# Patient Record
Sex: FEMALE | Race: AMERICAN INDIAN OR ALASKA NATIVE | NOT HISPANIC OR LATINO | Employment: FULL TIME | URBAN - METROPOLITAN AREA
[De-identification: names, ages, dates, MRNs, and addresses within clinical notes are randomized per-mention and may not be internally consistent; named-entity substitution may affect disease eponyms.]

---
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
## 2025-03-16 ENCOUNTER — HOSPITAL ENCOUNTER (INPATIENT)
Facility: MEDICAL CENTER | Age: 20
LOS: 2 days | DRG: 918 | End: 2025-03-18
Attending: STUDENT IN AN ORGANIZED HEALTH CARE EDUCATION/TRAINING PROGRAM | Admitting: STUDENT IN AN ORGANIZED HEALTH CARE EDUCATION/TRAINING PROGRAM
Payer: COMMERCIAL

## 2025-03-16 DIAGNOSIS — T14.91XA SUICIDE ATTEMPT (HCC): ICD-10-CM

## 2025-03-16 PROBLEM — T39.1X2A TYLENOL OVERDOSE, INTENTIONAL SELF-HARM, INITIAL ENCOUNTER (HCC): Status: ACTIVE | Noted: 2025-03-16

## 2025-03-16 LAB
APAP SERPL-MCNC: 171 UG/ML (ref 10–30)
APAP SERPL-MCNC: 84.9 UG/ML (ref 10–30)
ARTERIAL PATENCY WRIST A: ABNORMAL
BASE EXCESS BLDA CALC-SCNC: -6 MMOL/L (ref -4–3)
BODY TEMPERATURE: ABNORMAL DEGREES
CO2 BLDA-SCNC: 20 MMOL/L (ref 32–48)
DELSYS IDSYS: ABNORMAL
HCO3 BLDA-SCNC: 19.2 MMOL/L (ref 21–28)
INR PPP: 1.07 (ref 0.87–1.13)
LACTATE BLD-SCNC: 0.8 MMOL/L (ref 0.5–2)
LACTATE SERPL-SCNC: 1.3 MMOL/L (ref 0.5–2)
LPM ILPM: 0 LPM
PCO2 BLDA: 35.7 MMHG (ref 32–48)
PH BLDA: 7.34 [PH] (ref 7.35–7.45)
PO2 BLDA: 37 MMHG (ref 83–108)
PROTHROMBIN TIME: 13.9 SEC (ref 12–14.6)
SAO2 % BLDA: 67 % (ref 93–99)
SPECIMEN DRAWN FROM PATIENT: ABNORMAL

## 2025-03-16 PROCEDURE — 770000 HCHG ROOM/CARE - INTERMEDIATE ICU *

## 2025-03-16 PROCEDURE — 82803 BLOOD GASES ANY COMBINATION: CPT

## 2025-03-16 PROCEDURE — 93005 ELECTROCARDIOGRAM TRACING: CPT | Mod: TC | Performed by: STUDENT IN AN ORGANIZED HEALTH CARE EDUCATION/TRAINING PROGRAM

## 2025-03-16 PROCEDURE — 36600 WITHDRAWAL OF ARTERIAL BLOOD: CPT

## 2025-03-16 PROCEDURE — 85025 COMPLETE CBC W/AUTO DIFF WBC: CPT

## 2025-03-16 PROCEDURE — 99291 CRITICAL CARE FIRST HOUR: CPT | Performed by: STUDENT IN AN ORGANIZED HEALTH CARE EDUCATION/TRAINING PROGRAM

## 2025-03-16 PROCEDURE — 700101 HCHG RX REV CODE 250: Performed by: STUDENT IN AN ORGANIZED HEALTH CARE EDUCATION/TRAINING PROGRAM

## 2025-03-16 PROCEDURE — 80143 DRUG ASSAY ACETAMINOPHEN: CPT | Mod: 91

## 2025-03-16 PROCEDURE — 85610 PROTHROMBIN TIME: CPT

## 2025-03-16 PROCEDURE — 83605 ASSAY OF LACTIC ACID: CPT

## 2025-03-16 PROCEDURE — 700105 HCHG RX REV CODE 258: Performed by: STUDENT IN AN ORGANIZED HEALTH CARE EDUCATION/TRAINING PROGRAM

## 2025-03-16 PROCEDURE — 700111 HCHG RX REV CODE 636 W/ 250 OVERRIDE (IP): Mod: JZ | Performed by: STUDENT IN AN ORGANIZED HEALTH CARE EDUCATION/TRAINING PROGRAM

## 2025-03-16 PROCEDURE — 80053 COMPREHEN METABOLIC PANEL: CPT

## 2025-03-16 PROCEDURE — 83735 ASSAY OF MAGNESIUM: CPT

## 2025-03-16 RX ORDER — PROMETHAZINE HYDROCHLORIDE 12.5 MG/1
12.5-25 SUPPOSITORY RECTAL EVERY 4 HOURS PRN
Status: DISCONTINUED | OUTPATIENT
Start: 2025-03-16 | End: 2025-03-18 | Stop reason: HOSPADM

## 2025-03-16 RX ORDER — ONDANSETRON 2 MG/ML
4 INJECTION INTRAMUSCULAR; INTRAVENOUS EVERY 4 HOURS PRN
Status: DISCONTINUED | OUTPATIENT
Start: 2025-03-16 | End: 2025-03-18 | Stop reason: HOSPADM

## 2025-03-16 RX ORDER — HYDROMORPHONE HYDROCHLORIDE 1 MG/ML
1 INJECTION, SOLUTION INTRAMUSCULAR; INTRAVENOUS; SUBCUTANEOUS EVERY 4 HOURS PRN
Status: DISCONTINUED | OUTPATIENT
Start: 2025-03-16 | End: 2025-03-18

## 2025-03-16 RX ORDER — PROMETHAZINE HYDROCHLORIDE 25 MG/1
12.5-25 TABLET ORAL EVERY 4 HOURS PRN
Status: DISCONTINUED | OUTPATIENT
Start: 2025-03-16 | End: 2025-03-18 | Stop reason: HOSPADM

## 2025-03-16 RX ORDER — PROCHLORPERAZINE EDISYLATE 5 MG/ML
5-10 INJECTION INTRAMUSCULAR; INTRAVENOUS EVERY 4 HOURS PRN
Status: DISCONTINUED | OUTPATIENT
Start: 2025-03-16 | End: 2025-03-18 | Stop reason: HOSPADM

## 2025-03-16 RX ORDER — LABETALOL HYDROCHLORIDE 5 MG/ML
10 INJECTION, SOLUTION INTRAVENOUS EVERY 4 HOURS PRN
Status: DISCONTINUED | OUTPATIENT
Start: 2025-03-16 | End: 2025-03-18 | Stop reason: HOSPADM

## 2025-03-16 RX ORDER — ENOXAPARIN SODIUM 100 MG/ML
40 INJECTION SUBCUTANEOUS DAILY
Status: DISCONTINUED | OUTPATIENT
Start: 2025-03-17 | End: 2025-03-18 | Stop reason: HOSPADM

## 2025-03-16 RX ORDER — ONDANSETRON 4 MG/1
4 TABLET, ORALLY DISINTEGRATING ORAL EVERY 4 HOURS PRN
Status: DISCONTINUED | OUTPATIENT
Start: 2025-03-16 | End: 2025-03-18 | Stop reason: HOSPADM

## 2025-03-16 RX ADMIN — FOMEPIZOLE 1240 MG: 1 INJECTION, SOLUTION INTRAVENOUS at 20:09

## 2025-03-16 RX ADMIN — ACETYLCYSTEINE 17000 MG: 200 SOLUTION ORAL; RESPIRATORY (INHALATION) at 20:07

## 2025-03-16 ASSESSMENT — PATIENT HEALTH QUESTIONNAIRE - PHQ9
4. FEELING TIRED OR HAVING LITTLE ENERGY: MORE THAN HALF THE DAYS
7. TROUBLE CONCENTRATING ON THINGS, SUCH AS READING THE NEWSPAPER OR WATCHING TELEVISION: NOT AT ALL
6. FEELING BAD ABOUT YOURSELF - OR THAT YOU ARE A FAILURE OR HAVE LET YOURSELF OR YOUR FAMILY DOWN: NEARLY EVERY DAY
9. THOUGHTS THAT YOU WOULD BE BETTER OFF DEAD, OR OF HURTING YOURSELF: SEVERAL DAYS
2. FEELING DOWN, DEPRESSED, IRRITABLE, OR HOPELESS: MORE THAN HALF THE DAYS
5. POOR APPETITE OR OVEREATING: SEVERAL DAYS
3. TROUBLE FALLING OR STAYING ASLEEP OR SLEEPING TOO MUCH: MORE THAN HALF THE DAYS
SUM OF ALL RESPONSES TO PHQ QUESTIONS 1-9: 13
SUM OF ALL RESPONSES TO PHQ9 QUESTIONS 1 AND 2: 4
8. MOVING OR SPEAKING SO SLOWLY THAT OTHER PEOPLE COULD HAVE NOTICED. OR THE OPPOSITE, BEING SO FIGETY OR RESTLESS THAT YOU HAVE BEEN MOVING AROUND A LOT MORE THAN USUAL: NOT AT ALL
1. LITTLE INTEREST OR PLEASURE IN DOING THINGS: MORE THAN HALF THE DAYS

## 2025-03-16 ASSESSMENT — ENCOUNTER SYMPTOMS
EYES NEGATIVE: 1
CARDIOVASCULAR NEGATIVE: 1
RESPIRATORY NEGATIVE: 1
DEPRESSION: 1
MUSCULOSKELETAL NEGATIVE: 1
GASTROINTESTINAL NEGATIVE: 1
CONSTITUTIONAL NEGATIVE: 1
NEUROLOGICAL NEGATIVE: 1

## 2025-03-16 ASSESSMENT — PAIN DESCRIPTION - PAIN TYPE: TYPE: ACUTE PAIN

## 2025-03-16 ASSESSMENT — FIBROSIS 4 INDEX: FIB4 SCORE: 0.27

## 2025-03-16 NOTE — PROGRESS NOTES
[facility] DIRECT ADMISSION REPORT  Transferring facility: [facility]  Transferring physician: Dr. Perez    Chief complaint: Tylenol overdose  Pertinent history & patient course:     19-year-old female presented to outside facility after taking around 108 tablets of 500 mg acetaminophen around 10:30 AM on 3/16 trying to kill herself after breaking up with her boyfriend.  Labs at outside facility did not show elevated liver enzymes, case was discussed with poisoning center also with intensivist  who accepted the patient to IMCU.  Mucomyst provided at outside facility, to continue Mucomyst by protocol.    Pertinent imaging & lab results: Acetaminophen level  Consultants called prior to transfer and pertinent input from consultants: Intensivist  Code Status: Full code per transferring provider, I personally verified with the transferring provider patient's code status and the transferring provider has confirmed this with the patient.  Reason for Transfer: ICU  Further work up or recommendations requested prior to transfer: None    Patient accepted for transfer: Yes  Accepting [facility] Facility: [facility] - Nursing to notify the Triage Coordinator in the RTOC via Voalte or Phone ext. [phone] when patient arrives to the unit. The Triage Coordinator will assign the admitting provider.    Consultants to be called upon arrival: ICU if needed  Admission status: Inpatient.   Floor requested: IMCU  If ICU transfer, name of intensivist case discussed with and pertinent input from critical care:     The admitting provider is the point of contact for questions or concerns regarding patient's care.

## 2025-03-17 PROBLEM — E66.9 OBESITY (BMI 30-39.9): Status: ACTIVE | Noted: 2025-03-17

## 2025-03-17 LAB
ALBUMIN SERPL BCP-MCNC: 3.9 G/DL (ref 3.2–4.9)
ALBUMIN SERPL BCP-MCNC: 4 G/DL (ref 3.2–4.9)
ALBUMIN SERPL BCP-MCNC: 4 G/DL (ref 3.2–4.9)
ALBUMIN SERPL BCP-MCNC: 4.1 G/DL (ref 3.2–4.9)
ALBUMIN/GLOB SERPL: 1.3 G/DL
ALBUMIN/GLOB SERPL: 1.4 G/DL
ALP SERPL-CCNC: 66 U/L (ref 30–99)
ALP SERPL-CCNC: 67 U/L (ref 30–99)
ALP SERPL-CCNC: 69 U/L (ref 30–99)
ALP SERPL-CCNC: 73 U/L (ref 30–99)
ALT SERPL-CCNC: 37 U/L (ref 2–50)
ALT SERPL-CCNC: 38 U/L (ref 2–50)
ALT SERPL-CCNC: 40 U/L (ref 2–50)
ALT SERPL-CCNC: 42 U/L (ref 2–50)
ANION GAP SERPL CALC-SCNC: 10 MMOL/L (ref 7–16)
ANION GAP SERPL CALC-SCNC: 14 MMOL/L (ref 7–16)
APAP SERPL-MCNC: 22.6 UG/ML (ref 10–30)
APAP SERPL-MCNC: 39.8 UG/ML (ref 10–30)
APAP SERPL-MCNC: 8.2 UG/ML (ref 10–30)
APAP SERPL-MCNC: <5 UG/ML (ref 10–30)
APPEARANCE UR: CLEAR
AST SERPL-CCNC: 21 U/L (ref 12–45)
AST SERPL-CCNC: 24 U/L (ref 12–45)
AST SERPL-CCNC: 25 U/L (ref 12–45)
AST SERPL-CCNC: 26 U/L (ref 12–45)
BASOPHILS # BLD AUTO: 0.1 % (ref 0–1.8)
BASOPHILS # BLD: 0.02 K/UL (ref 0–0.12)
BILIRUB SERPL-MCNC: 0.3 MG/DL (ref 0.1–1.5)
BILIRUB UR QL STRIP.AUTO: NEGATIVE
BUN SERPL-MCNC: 11 MG/DL (ref 8–22)
BUN SERPL-MCNC: 13 MG/DL (ref 8–22)
CALCIUM ALBUM COR SERPL-MCNC: 8.5 MG/DL (ref 8.5–10.5)
CALCIUM ALBUM COR SERPL-MCNC: 8.8 MG/DL (ref 8.5–10.5)
CALCIUM ALBUM COR SERPL-MCNC: 8.8 MG/DL (ref 8.5–10.5)
CALCIUM ALBUM COR SERPL-MCNC: 9 MG/DL (ref 8.5–10.5)
CALCIUM SERPL-MCNC: 8.6 MG/DL (ref 8.5–10.5)
CALCIUM SERPL-MCNC: 8.8 MG/DL (ref 8.5–10.5)
CALCIUM SERPL-MCNC: 8.8 MG/DL (ref 8.5–10.5)
CALCIUM SERPL-MCNC: 8.9 MG/DL (ref 8.5–10.5)
CHLORIDE SERPL-SCNC: 107 MMOL/L (ref 96–112)
CHLORIDE SERPL-SCNC: 107 MMOL/L (ref 96–112)
CHLORIDE SERPL-SCNC: 109 MMOL/L (ref 96–112)
CHLORIDE SERPL-SCNC: 114 MMOL/L (ref 96–112)
CO2 SERPL-SCNC: 14 MMOL/L (ref 20–33)
CO2 SERPL-SCNC: 18 MMOL/L (ref 20–33)
CO2 SERPL-SCNC: 19 MMOL/L (ref 20–33)
CO2 SERPL-SCNC: 20 MMOL/L (ref 20–33)
COLOR UR: YELLOW
CREAT SERPL-MCNC: 0.61 MG/DL (ref 0.5–1.4)
CREAT SERPL-MCNC: 0.63 MG/DL (ref 0.5–1.4)
CREAT SERPL-MCNC: 0.67 MG/DL (ref 0.5–1.4)
CREAT SERPL-MCNC: 0.69 MG/DL (ref 0.5–1.4)
EKG IMPRESSION: NORMAL
EOSINOPHIL # BLD AUTO: 0.11 K/UL (ref 0–0.51)
EOSINOPHIL NFR BLD: 0.8 % (ref 0–6.9)
ERYTHROCYTE [DISTWIDTH] IN BLOOD BY AUTOMATED COUNT: 40.6 FL (ref 35.9–50)
GFR SERPLBLD CREATININE-BSD FMLA CKD-EPI: 127 ML/MIN/1.73 M 2
GFR SERPLBLD CREATININE-BSD FMLA CKD-EPI: 128 ML/MIN/1.73 M 2
GFR SERPLBLD CREATININE-BSD FMLA CKD-EPI: 130 ML/MIN/1.73 M 2
GFR SERPLBLD CREATININE-BSD FMLA CKD-EPI: 131 ML/MIN/1.73 M 2
GLOBULIN SER CALC-MCNC: 2.9 G/DL (ref 1.9–3.5)
GLOBULIN SER CALC-MCNC: 2.9 G/DL (ref 1.9–3.5)
GLOBULIN SER CALC-MCNC: 3 G/DL (ref 1.9–3.5)
GLOBULIN SER CALC-MCNC: 3.1 G/DL (ref 1.9–3.5)
GLUCOSE SERPL-MCNC: 106 MG/DL (ref 65–99)
GLUCOSE SERPL-MCNC: 117 MG/DL (ref 65–99)
GLUCOSE SERPL-MCNC: 90 MG/DL (ref 65–99)
GLUCOSE SERPL-MCNC: 98 MG/DL (ref 65–99)
GLUCOSE UR STRIP.AUTO-MCNC: NEGATIVE MG/DL
HCG UR QL: NEGATIVE
HCT VFR BLD AUTO: 40.3 % (ref 37–47)
HGB BLD-MCNC: 13.8 G/DL (ref 12–16)
IMM GRANULOCYTES # BLD AUTO: 0.02 K/UL (ref 0–0.11)
IMM GRANULOCYTES NFR BLD AUTO: 0.1 % (ref 0–0.9)
INR PPP: 1.1 (ref 0.87–1.13)
INR PPP: 1.1 (ref 0.87–1.13)
KETONES UR STRIP.AUTO-MCNC: 15 MG/DL
LEUKOCYTE ESTERASE UR QL STRIP.AUTO: NEGATIVE
LYMPHOCYTES # BLD AUTO: 3.7 K/UL (ref 1–4.8)
LYMPHOCYTES NFR BLD: 26.3 % (ref 22–41)
MAGNESIUM SERPL-MCNC: 2.2 MG/DL (ref 1.5–2.5)
MCH RBC QN AUTO: 30.3 PG (ref 27–33)
MCHC RBC AUTO-ENTMCNC: 34.2 G/DL (ref 32.2–35.5)
MCV RBC AUTO: 88.4 FL (ref 81.4–97.8)
MICRO URNS: ABNORMAL
MONOCYTES # BLD AUTO: 0.96 K/UL (ref 0–0.85)
MONOCYTES NFR BLD AUTO: 6.8 % (ref 0–13.4)
NEUTROPHILS # BLD AUTO: 9.27 K/UL (ref 1.82–7.42)
NEUTROPHILS NFR BLD: 65.9 % (ref 44–72)
NITRITE UR QL STRIP.AUTO: NEGATIVE
NRBC # BLD AUTO: 0 K/UL
NRBC BLD-RTO: 0 /100 WBC (ref 0–0.2)
PH UR STRIP.AUTO: 5.5 [PH] (ref 5–8)
PLATELET # BLD AUTO: 340 K/UL (ref 164–446)
PMV BLD AUTO: 9.8 FL (ref 9–12.9)
POTASSIUM SERPL-SCNC: 2.8 MMOL/L (ref 3.6–5.5)
POTASSIUM SERPL-SCNC: 3.2 MMOL/L (ref 3.6–5.5)
POTASSIUM SERPL-SCNC: 4.5 MMOL/L (ref 3.6–5.5)
POTASSIUM SERPL-SCNC: 4.8 MMOL/L (ref 3.6–5.5)
PROT SERPL-MCNC: 6.9 G/DL (ref 6–8.2)
PROT SERPL-MCNC: 6.9 G/DL (ref 6–8.2)
PROT SERPL-MCNC: 7 G/DL (ref 6–8.2)
PROT SERPL-MCNC: 7.1 G/DL (ref 6–8.2)
PROT UR QL STRIP: NEGATIVE MG/DL
PROTHROMBIN TIME: 14.2 SEC (ref 12–14.6)
PROTHROMBIN TIME: 14.2 SEC (ref 12–14.6)
RBC # BLD AUTO: 4.56 M/UL (ref 4.2–5.4)
RBC UR QL AUTO: NEGATIVE
SODIUM SERPL-SCNC: 137 MMOL/L (ref 135–145)
SODIUM SERPL-SCNC: 138 MMOL/L (ref 135–145)
SODIUM SERPL-SCNC: 138 MMOL/L (ref 135–145)
SODIUM SERPL-SCNC: 139 MMOL/L (ref 135–145)
SP GR UR STRIP.AUTO: >1.045
UROBILINOGEN UR STRIP.AUTO-MCNC: 1 EU/DL
WBC # BLD AUTO: 14.1 K/UL (ref 4.8–10.8)

## 2025-03-17 PROCEDURE — A9270 NON-COVERED ITEM OR SERVICE: HCPCS | Performed by: STUDENT IN AN ORGANIZED HEALTH CARE EDUCATION/TRAINING PROGRAM

## 2025-03-17 PROCEDURE — A9270 NON-COVERED ITEM OR SERVICE: HCPCS | Performed by: INTERNAL MEDICINE

## 2025-03-17 PROCEDURE — 700102 HCHG RX REV CODE 250 W/ 637 OVERRIDE(OP): Performed by: INTERNAL MEDICINE

## 2025-03-17 PROCEDURE — 80143 DRUG ASSAY ACETAMINOPHEN: CPT

## 2025-03-17 PROCEDURE — 99291 CRITICAL CARE FIRST HOUR: CPT | Performed by: INTERNAL MEDICINE

## 2025-03-17 PROCEDURE — 700111 HCHG RX REV CODE 636 W/ 250 OVERRIDE (IP): Performed by: INTERNAL MEDICINE

## 2025-03-17 PROCEDURE — 81003 URINALYSIS AUTO W/O SCOPE: CPT

## 2025-03-17 PROCEDURE — 700105 HCHG RX REV CODE 258: Performed by: STUDENT IN AN ORGANIZED HEALTH CARE EDUCATION/TRAINING PROGRAM

## 2025-03-17 PROCEDURE — 80053 COMPREHEN METABOLIC PANEL: CPT | Mod: 91

## 2025-03-17 PROCEDURE — 93010 ELECTROCARDIOGRAM REPORT: CPT | Performed by: INTERNAL MEDICINE

## 2025-03-17 PROCEDURE — 700101 HCHG RX REV CODE 250: Performed by: STUDENT IN AN ORGANIZED HEALTH CARE EDUCATION/TRAINING PROGRAM

## 2025-03-17 PROCEDURE — 85610 PROTHROMBIN TIME: CPT | Mod: 91

## 2025-03-17 PROCEDURE — 99255 IP/OBS CONSLTJ NEW/EST HI 80: CPT | Performed by: PSYCHIATRY & NEUROLOGY

## 2025-03-17 PROCEDURE — 80307 DRUG TEST PRSMV CHEM ANLYZR: CPT

## 2025-03-17 PROCEDURE — 700102 HCHG RX REV CODE 250 W/ 637 OVERRIDE(OP): Performed by: STUDENT IN AN ORGANIZED HEALTH CARE EDUCATION/TRAINING PROGRAM

## 2025-03-17 PROCEDURE — 81025 URINE PREGNANCY TEST: CPT

## 2025-03-17 PROCEDURE — G0480 DRUG TEST DEF 1-7 CLASSES: HCPCS

## 2025-03-17 PROCEDURE — 700111 HCHG RX REV CODE 636 W/ 250 OVERRIDE (IP): Mod: JZ | Performed by: STUDENT IN AN ORGANIZED HEALTH CARE EDUCATION/TRAINING PROGRAM

## 2025-03-17 PROCEDURE — 770000 HCHG ROOM/CARE - INTERMEDIATE ICU *

## 2025-03-17 RX ORDER — POTASSIUM CHLORIDE 1500 MG/1
40 TABLET, EXTENDED RELEASE ORAL EVERY 4 HOURS
Status: COMPLETED | OUTPATIENT
Start: 2025-03-17 | End: 2025-03-17

## 2025-03-17 RX ORDER — POTASSIUM CHLORIDE 7.45 MG/ML
10 INJECTION INTRAVENOUS
Status: DISPENSED | OUTPATIENT
Start: 2025-03-17 | End: 2025-03-17

## 2025-03-17 RX ORDER — POTASSIUM CHLORIDE 7.45 MG/ML
10 INJECTION INTRAVENOUS ONCE
Status: COMPLETED | OUTPATIENT
Start: 2025-03-17 | End: 2025-03-17

## 2025-03-17 RX ORDER — POTASSIUM CHLORIDE 1500 MG/1
40 TABLET, EXTENDED RELEASE ORAL 2 TIMES DAILY
Status: DISCONTINUED | OUTPATIENT
Start: 2025-03-17 | End: 2025-03-17

## 2025-03-17 RX ORDER — FLUVOXAMINE MALEATE 50 MG
50 TABLET ORAL EVERY EVENING
Status: DISCONTINUED | OUTPATIENT
Start: 2025-03-17 | End: 2025-03-18 | Stop reason: HOSPADM

## 2025-03-17 RX ADMIN — POTASSIUM CHLORIDE 40 MEQ: 1500 TABLET, EXTENDED RELEASE ORAL at 02:35

## 2025-03-17 RX ADMIN — ACETYLCYSTEINE 17000 MG: 200 SOLUTION ORAL; RESPIRATORY (INHALATION) at 15:43

## 2025-03-17 RX ADMIN — POTASSIUM CHLORIDE 10 MEQ: 7.46 INJECTION, SOLUTION INTRAVENOUS at 09:10

## 2025-03-17 RX ADMIN — POTASSIUM CHLORIDE 40 MEQ: 1500 TABLET, EXTENDED RELEASE ORAL at 08:30

## 2025-03-17 RX ADMIN — POTASSIUM CHLORIDE 40 MEQ: 1500 TABLET, EXTENDED RELEASE ORAL at 05:03

## 2025-03-17 RX ADMIN — ENOXAPARIN SODIUM 40 MG: 100 INJECTION SUBCUTANEOUS at 18:47

## 2025-03-17 RX ADMIN — FOMEPIZOLE 830 MG: 1 INJECTION, SOLUTION INTRAVENOUS at 08:29

## 2025-03-17 RX ADMIN — HYDROMORPHONE HYDROCHLORIDE 1 MG: 1 INJECTION, SOLUTION INTRAMUSCULAR; INTRAVENOUS; SUBCUTANEOUS at 02:54

## 2025-03-17 RX ADMIN — FLUVOXAMINE MALEATE 50 MG: 50 TABLET, COATED ORAL at 21:15

## 2025-03-17 RX ADMIN — POTASSIUM CHLORIDE 10 MEQ: 7.46 INJECTION, SOLUTION INTRAVENOUS at 11:29

## 2025-03-17 SDOH — ECONOMIC STABILITY: TRANSPORTATION INSECURITY
IN THE PAST 12 MONTHS, HAS THE LACK OF TRANSPORTATION KEPT YOU FROM MEDICAL APPOINTMENTS OR FROM GETTING MEDICATIONS?: NO

## 2025-03-17 SDOH — ECONOMIC STABILITY: TRANSPORTATION INSECURITY
IN THE PAST 12 MONTHS, HAS LACK OF RELIABLE TRANSPORTATION KEPT YOU FROM MEDICAL APPOINTMENTS, MEETINGS, WORK OR FROM GETTING THINGS NEEDED FOR DAILY LIVING?: NO

## 2025-03-17 ASSESSMENT — FIBROSIS 4 INDEX
FIB4 SCORE: 0.22
FIB4 SCORE: 0.22

## 2025-03-17 ASSESSMENT — COGNITIVE AND FUNCTIONAL STATUS - GENERAL
SUGGESTED CMS G CODE MODIFIER MOBILITY: CH
SUGGESTED CMS G CODE MODIFIER DAILY ACTIVITY: CH
DAILY ACTIVITIY SCORE: 24
MOBILITY SCORE: 24

## 2025-03-17 ASSESSMENT — LIFESTYLE VARIABLES
SUBSTANCE_ABUSE: 0
TOTAL SCORE: 0
TOTAL SCORE: 0
ALCOHOL_USE: NO
HAVE PEOPLE ANNOYED YOU BY CRITICIZING YOUR DRINKING: NO
CONSUMPTION TOTAL: NEGATIVE
ON A TYPICAL DAY WHEN YOU DRINK ALCOHOL HOW MANY DRINKS DO YOU HAVE: 0
TOTAL SCORE: 0
EVER HAD A DRINK FIRST THING IN THE MORNING TO STEADY YOUR NERVES TO GET RID OF A HANGOVER: NO
EVER FELT BAD OR GUILTY ABOUT YOUR DRINKING: NO
HOW MANY TIMES IN THE PAST YEAR HAVE YOU HAD 5 OR MORE DRINKS IN A DAY: 0
HAVE YOU EVER FELT YOU SHOULD CUT DOWN ON YOUR DRINKING: NO
AVERAGE NUMBER OF DAYS PER WEEK YOU HAVE A DRINK CONTAINING ALCOHOL: 0

## 2025-03-17 ASSESSMENT — SOCIAL DETERMINANTS OF HEALTH (SDOH)
IN THE PAST 12 MONTHS, HAS THE ELECTRIC, GAS, OIL, OR WATER COMPANY THREATENED TO SHUT OFF SERVICE IN YOUR HOME?: NO
WITHIN THE LAST YEAR, HAVE YOU BEEN AFRAID OF YOUR PARTNER OR EX-PARTNER?: NO
WITHIN THE LAST YEAR, HAVE TO BEEN RAPED OR FORCED TO HAVE ANY KIND OF SEXUAL ACTIVITY BY YOUR PARTNER OR EX-PARTNER?: NO
WITHIN THE LAST YEAR, HAVE YOU BEEN KICKED, HIT, SLAPPED, OR OTHERWISE PHYSICALLY HURT BY YOUR PARTNER OR EX-PARTNER?: YES
WITHIN THE PAST 12 MONTHS, YOU WORRIED THAT YOUR FOOD WOULD RUN OUT BEFORE YOU GOT THE MONEY TO BUY MORE: NEVER TRUE
WITHIN THE LAST YEAR, HAVE YOU BEEN HUMILIATED OR EMOTIONALLY ABUSED IN OTHER WAYS BY YOUR PARTNER OR EX-PARTNER?: YES
WITHIN THE PAST 12 MONTHS, THE FOOD YOU BOUGHT JUST DIDN'T LAST AND YOU DIDN'T HAVE MONEY TO GET MORE: NEVER TRUE

## 2025-03-17 ASSESSMENT — ENCOUNTER SYMPTOMS
HEADACHES: 0
ABDOMINAL PAIN: 0
PHOTOPHOBIA: 0
SENSORY CHANGE: 0
SPUTUM PRODUCTION: 0
TREMORS: 0
ORTHOPNEA: 0
NECK PAIN: 0
VOMITING: 0
FOCAL WEAKNESS: 0
BLURRED VISION: 0
PALPITATIONS: 0
EYE PAIN: 0
SPEECH CHANGE: 0
MYALGIAS: 0
DIZZINESS: 0
TINGLING: 0
WEIGHT LOSS: 0
COUGH: 0
CHILLS: 0
NAUSEA: 0
SHORTNESS OF BREATH: 0
FEVER: 0
DOUBLE VISION: 0
HALLUCINATIONS: 0
DIARRHEA: 0
CONSTIPATION: 0
BACK PAIN: 0

## 2025-03-17 ASSESSMENT — PAIN DESCRIPTION - PAIN TYPE
TYPE: ACUTE PAIN

## 2025-03-17 NOTE — PROGRESS NOTES
4 Eyes Skin Assessment Completed by KVNG Barnhart and KVNG Crum.    Head WDL  Ears WDL  Nose WDL  Mouth WDL  Neck WDL  Breast/Chest WDL  Shoulder Blades WDL  Spine WDL  (R) Arm/Elbow/Hand Redness and Blanching  (L) Arm/Elbow/Hand Redness and Blanching, ,left pointer finger scrap  Abdomen WDL  Groin WDL  Scrotum/Coccyx/Buttocks WDL  (R) Leg WDL  (L) Leg WDL  (R) Heel/Foot/Toe WDL  (L) Heel/Foot/Toe WDL          Devices In Places ECG, Blood Pressure Cuff, and Pulse Ox      Interventions In Place Pillows and Low Air Loss Mattress    Possible Skin Injury No    Pictures Uploaded Into Epic N/A  Wound Consult Placed N/A  RN Wound Prevention Protocol Ordered No

## 2025-03-17 NOTE — H&P
Hospital Medicine History & Physical Note    Date of Service  3/16/2025    Primary Care Physician  Pcp Pt States None    Consultants  None    Code Status  Full Code    Chief Complaint  Tylenol overdose    History of Presenting Illness  Dior Craft is a 19 y.o. female who presented with a Tylenol overdose.  Patient denies history of psychiatric disorder and suicidal ideations.  Today, she got an impromptu text from her boyfriend stated that he wanted to break up with her.  She felt emotional about it and drove to [facility] and bought Tylenol.  She drove to a park and estimates that she consumed about 100 tablets of 500 mg of Tylenol.  She estimates that she took them all at about 11 AM.  She told her boyfriend about it, he went to pick her up and dropped her off at outside facility ER for further evaluation.    At outside facility ER:  AST 23 ALT 41 alkaline phosphatase 91 alkaline phosphatase 474 INR 1.2  Tylenol 474 as of 12:45 PM from outside hospital.    Patient was given loading dose of Mucomyst and transferred to [facility] for further evaluation.    Patient was seen by me at bedside, she expresses that she does not wish to die and has never attempted to commit suicide in the past.  Her brother unfortunately has committed suicide about 9 years ago.  She currently has no symptoms and is comfortable at this time.    I discussed the plan of care with patient.    Review of Systems  Review of Systems   Constitutional: Negative.    HENT: Negative.     Eyes: Negative.    Respiratory: Negative.     Cardiovascular: Negative.    Gastrointestinal: Negative.    Genitourinary: Negative.    Musculoskeletal: Negative.    Skin: Negative.    Neurological: Negative.    Endo/Heme/Allergies: Negative.    Psychiatric/Behavioral:  Positive for depression and suicidal ideas.        Past Medical History   has no past medical history on file.    Surgical History   has no past surgical history on file.     Family History  family  "history is not on file.   Family history reviewed with patient. There is no family history that is pertinent to the chief complaint.     Social History   reports that she has never smoked. She has never used smokeless tobacco. She reports current alcohol use. She reports current drug use. Drug: Inhaled.    Allergies  No Known Allergies    Medications  Cannot display prior to admission medications because the patient has not been admitted in this contact.       Physical Exam  BP: ()/()                           Physical Exam  Constitutional:       Appearance: Normal appearance. She is obese.   HENT:      Head: Normocephalic.      Nose: Nose normal.      Mouth/Throat:      Mouth: Mucous membranes are moist.   Eyes:      Pupils: Pupils are equal, round, and reactive to light.   Cardiovascular:      Rate and Rhythm: Normal rate and regular rhythm.      Pulses: Normal pulses.      Heart sounds: Normal heart sounds.   Pulmonary:      Effort: Pulmonary effort is normal.      Breath sounds: Normal breath sounds.   Abdominal:      General: Abdomen is flat. Bowel sounds are normal.      Palpations: Abdomen is soft.   Musculoskeletal:         General: Normal range of motion.      Cervical back: Neck supple.   Skin:     General: Skin is warm.   Neurological:      General: No focal deficit present.      Mental Status: She is alert and oriented to person, place, and time. Mental status is at baseline.   Psychiatric:         Mood and Affect: Mood normal.         Behavior: Behavior normal.         Thought Content: Thought content normal.         Judgment: Judgment normal.         Laboratory:          No results for input(s): \"ALTSGPT\", \"ASTSGOT\", \"ALKPHOSPHAT\", \"TBILIRUBIN\", \"DBILIRUBIN\", \"GAMMAGT\", \"AMYLASE\", \"LIPASE\", \"ALB\", \"PREALBUMIN\", \"GLUCOSE\" in the last 72 hours.      No results for input(s): \"NTPROBNP\" in the last 72 hours.      No results for input(s): \"TROPONINT\" in the last 72 hours.    Imaging:  No orders to display " "      no X-Ray or EKG requiring interpretation    Assessment/Plan:  Justification for Admission Status  I anticipate this patient will require at least two midnights for appropriate medical management, necessitating inpatient admission because patient has Tylenol overdose    Patient will need a Intermediate Care (Adult and Pediatrics) bed on CARDIOLOGY service .  The need is secondary to Tylenol overdose.    * Tylenol overdose, intentional self-harm, initial encounter (HCC)- (present on admission)  Assessment & Plan  Attempted suicide when patient took about 100 tablets of 500 mg Tylenol's at about 11 AM this morning.  Normal LFTs at outside hospital.  Tylenol level 474 at 12:45 PM at outside hospital  Check Tylenol levels every 4 hours, CMP every 12 hours    Pharmacy spoke with poison control regarding recommendations:  \"  Double terminal dose of NAC - 200 mg/kg Q16H   Add fomepizole. Start with loading dose of fomepizole 15 mg/kg, then transition to fomepizole 10 mg/kg Q12H until tylenol level is negative.   Collect CMP Q12H, add on VBG to assess metabolic acidosis  NAC to continue until all of the following criteria are met:  - INR < 2  - Patient clinically well  - LFTs decrease at least 25% from peak  - APAP level < 10 mcg/mL\"    Suicide attempt (HCC)  Assessment & Plan  Has never attempted suicide in the past  Legal hold placed  One-to-one sitter  Psychiatry consult in a.m.        Patient is critically ill.   The patient continues to have: Tylenol overdose  The vital organ system that is affected is the: Liver, CNS  If untreated there is a high chance of deterioration into: Acute liver failure requiring liver transplant  And eventually death.   The critical care that I am providing today is: Loading dose of fomepizole and fomepizole every 12 hours until negative Tylenol levels and Mucomyst every 16 hours  The critical that has been undertaken is medically complex.   There has been no overlap in critical care " time.   Critical Care Time not including procedures: 45      VTE prophylaxis: enoxaparin ppx

## 2025-03-17 NOTE — ASSESSMENT & PLAN NOTE
Has never attempted suicide in the past  Legal hold placed  One-to-one sitter  I discussed plan of care with psychiatrist Dr. Snow.  I started her on fluvoxamine 50 mg daily

## 2025-03-17 NOTE — PROGRESS NOTES
Patient admitted with suicide risk. All unnecessary items taken out of room, human sitter in room with patient, psych eval in place, MD Zelaya assessed patient and deemed parents okay to be in room. No phone, call light, personal belongings, or other visitors allowed. Legal paperwork filled out, suicide screenings completed. Educated patient and mother on of admission process.

## 2025-03-17 NOTE — PROGRESS NOTES
Pts family upset over visitor restriction. Explained that our visiting hours end at 2000. I also explained that for legal hold patient we do not typically allow any sort of visitation and that the Dr has made an exception but we will not be allowing visitors to come in and out over night. Allowed for mother to come in and say aimee but no other visitors will be allowed past visiting hours and once mother leaves she will not be allowed back in until morning.

## 2025-03-17 NOTE — CARE PLAN
The patient is Stable - Low risk of patient condition declining or worsening    Shift Goals  Clinical Goals: Monitor neuro status, legal hold, patient safety  Patient Goals: to go home and return to school  Family Goals: updates    Progress made toward(s) clinical / shift goals:    Problem: Provide Safe Environment  Goal: Suicide environmental safety, protocols, policies, and practices will be implemented  Description: Target End Date:  resolve day 11.  Remove objects or personal belongings that may cause harm or injury to self or others2.  Dietary tray modifications (paperware)3.  Provide a safe environment4.  Render close patient supervision by sustaining observation or awareness of the patient at all times  Outcome: Progressing     Problem: Pain - Standard  Goal: Alleviation of pain or a reduction in pain to the patient’s comfort goal  Description: Target End Date:  Prior to discharge or change in level of careDocument on Vitals flowsheet1.  Document pain using the appropriate pain scale per order or unit policy2.  Educate and implement non-pharmacologic comfort measures (i.e. relaxation, distraction, massage, cold/heat therapy, etc.)3.  Pain management medications as ordered4.  Reassess pain after pain med administration per policy5.  If opiods administered assess patient's response to pain medication is appropriate per POSS sedation scale6.  Follow pain management plan developed in collaboration with patient and interdisciplinary team (including palliative care or pain specialists if applicable)  Outcome: Progressing       Pt was c/o UTI symptoms overnight. Dilaudid given and provided adequate pain relief. 1:1 sitter at bedside. Sinus tiffany overnight, asymptomatic. No other acute events overnight

## 2025-03-17 NOTE — CARE PLAN
The patient is Stable - Low risk of patient condition declining or worsening    Shift Goals  Clinical Goals: Correct electrolytes, pt safety  Patient Goals: Fix potassium, No tylenol  Family Goals: PACHECO    Progress made toward(s) clinical / shift goals:    Problem: Knowledge Deficit - Standard  Goal: Patient and family/care givers will demonstrate understanding of plan of care, disease process/condition, diagnostic tests and medications  Description: Target End Date:  1-3 days or as soon as patient condition allowsDocument in Patient Education1.  Patient and family/caregiver oriented to unit, equipment, visitation policy and means for communicating concern2.  Complete/review Learning Assessment3.  Assess knowledge level of disease process/condition, treatment plan, diagnostic tests and medications4.  Explain disease process/condition, treatment plan, diagnostic tests and medications  Outcome: Progressing     Problem: Provide Safe Environment  Goal: Suicide environmental safety, protocols, policies, and practices will be implemented  Description: Target End Date:  resolve day 11.  Remove objects or personal belongings that may cause harm or injury to self or others2.  Dietary tray modifications (paperware)3.  Provide a safe environment4.  Render close patient supervision by sustaining observation or awareness of the patient at all times  Outcome: Progressing     Problem: Psychosocial  Goal: Patient's ability to identify and develop effective coping behaviors will improve  Description: Target End Date:  1 to 3 days1.  Present opportunities for the patient to express thoughts, and feelings in a nonjudgmental environment2.  Help the patient with problem-solving in a constructive manner.3.  Educate the patient on cognitive-behavioral self-management responses to suicidal thoughts.4.  Introduce the use of self-expression methods to manage suicidal feelings5.  Provide emotional support6.  Encourage identification of  positive aspects of self  Outcome: Progressing  Goal: Patient's ability to identify and utilize available support systems will improve  Description: Target End Date:  1 to 3 days1.  Help patient identify available resources and support systems2.  Collaborate with interdisciplinary team3.  Collaborate with patient, family/caregiver and other support systems  Outcome: Progressing     Problem: Depression  Goal: Patient and family/caregiver will verbalize accurate information about at least two of the possible causes of depression, three-four of the signs and symptoms of depression  Description: Target End Date:  1 to 3 days1.  Assess the patient's and family/caregiver's knowledge regarding depression and its causes.2.  Explain to the patient and family/caregiver regarding the major symptoms of depression.3.  Inform the patient and family/caregiver that depression can be treated through medications and psychotherapy.4.  Allow the patient to express feelings and perceptions5.  Express hope to the patient with realistic comments about the patient's strengths and resources.5.  Give positive feedback after a tasks are achieved.6.  Encourage identification of positive aspects of self.7.  Educate the patient about crisis intervention services such as suicide hotlines and other resources.  Outcome: Progressing     Problem: Pain - Standard  Goal: Alleviation of pain or a reduction in pain to the patient’s comfort goal  Description: Target End Date:  Prior to discharge or change in level of careDocument on Vitals flowsheet1.  Document pain using the appropriate pain scale per order or unit policy2.  Educate and implement non-pharmacologic comfort measures (i.e. relaxation, distraction, massage, cold/heat therapy, etc.)3.  Pain management medications as ordered4.  Reassess pain after pain med administration per policy5.  If opiods administered assess patient's response to pain medication is appropriate per POSS sedation scale6.   Follow pain management plan developed in collaboration with patient and interdisciplinary team (including palliative care or pain specialists if applicable)  Outcome: Progressing     Patient is not progressing towards the following goals:

## 2025-03-17 NOTE — PROGRESS NOTES
Pharmacy Note: Poison control updated for massive tylenol overdose. Patient took 108 tablets of tylenol 500 mg at ~1030 today.     Poison control case #: [id number]     [facility] labs:  Initial APAP level: 474 @1145  > Repeat APAP level: 330 @1430  AST 23  ALT 41     Patient received NAC at transferring facility ([facility]): First dose of NAC 25817 mg @1255, second dose of NAC 4225 mg @1313. Patient given 2 doses of NAC, totaling ~200 mg/kg.      Discussed w/ Poison Control nurse (Tammie).  also consulted. Recommendations via PC:  Double terminal dose of NAC - 200 mg/kg Q16H   Add fomepizole. Start with loading dose of fomepizole 15 mg/kg, then transition to fomepizole 10 mg/kg Q12H until tylenol level is negative.   Collect CMP Q12H, add on VBG to assess metabolic acidosis  NAC to continue until all of the following criteria are met:  - INR < 2  - Patient clinically well  - LFTs decrease at least 25% from peak  - APAP level < 10 mcg/mL    Caesar Zuñiga, PharmD

## 2025-03-17 NOTE — ASSESSMENT & PLAN NOTE
Attempted suicide when patient took about 100 tablets of 500 mg Tylenol's at about 11 AM this morning.  Normal LFTs at outside hospital.  Tylenol level 474 at 12:45 PM at outside hospital  Check Tylenol levels every 4 hours, CMP every 12 hours    Has now completed NAC/Fomepazole protocol  LFTs and coag's are normal  Pt with no complaints  Medically cleared for DC to home or in Psych hospital per Psychiatry team recommendations

## 2025-03-17 NOTE — PROGRESS NOTES
"Hospital Medicine Daily Progress Note    Date of Service  3/17/2025    Chief Complaint  Dior Craft is a 19 y.o. female admitted 3/16/2025 with overdose on Tylenol    Hospital Course  As per Dr. Zelaya's note    \"Dior Craft is a 19 y.o. female who presented with a Tylenol overdose.  Patient denies history of psychiatric disorder and suicidal ideations.  Today, she got an impromptu text from her boyfriend stated that he wanted to break up with her.  She felt emotional about it and drove to [facility] and bought Tylenol.  She drove to a park and estimates that she consumed about 100 tablets of 500 mg of Tylenol.  She estimates that she took them all at about 11 AM.  She told her boyfriend about it, he went to pick her up and dropped her off at outside facility ER for further evaluation.     At outside facility ER:  AST 23 ALT 41 alkaline phosphatase 91 alkaline phosphatase 474 INR 1.2  Tylenol 474 as of 12:45 PM from outside hospital.     Patient was given loading dose of Mucomyst and transferred to [facility] for further evaluation.\"    Interval Problem Update    03/17/25    I evaluated and examined her at the bedside.  She denies suicidal ideation.  She denies any complaint of nausea and vomiting or pain.  CMP did not show any acute abnormalities.  Her current AST is 21 ALT of 38.  Tylenol level has been trending up current Tylenol level is 8.2.  I am continuing acetylcysteine infusion.  She is also on Fomepizole infusion as well.  I am performing every 4 hours Tylenol level draws and close monitoring of liver enzymes.  I discussed plan of care with psychiatrist and I started her on fluvoxamine 50 mg daily for depression.    I have discussed this patient's plan of care and discharge plan at IDT rounds today with Case Management, Nursing, Nursing leadership, and other members of the IDT team.    Consultants/Specialty  psychiatry    Code Status  Full Code    Disposition  The patient is not medically " cleared for discharge to home or a post-acute facility.      I have placed the appropriate orders for post-discharge needs.    Review of Systems  Review of Systems   Constitutional:  Negative for chills, fever and weight loss.   HENT:  Negative for hearing loss and tinnitus.    Eyes:  Negative for blurred vision, double vision, photophobia and pain.   Respiratory:  Negative for cough, sputum production and shortness of breath.    Cardiovascular:  Negative for chest pain, palpitations, orthopnea and leg swelling.   Gastrointestinal:  Negative for abdominal pain, constipation, diarrhea, nausea and vomiting.   Genitourinary:  Negative for dysuria, frequency and urgency.   Musculoskeletal:  Negative for back pain, joint pain, myalgias and neck pain.   Skin:  Negative for rash.   Neurological:  Negative for dizziness, tingling, tremors, sensory change, speech change, focal weakness and headaches.   Psychiatric/Behavioral:  Negative for hallucinations, substance abuse and suicidal ideas.    All other systems reviewed and are negative.       Physical Exam  Temp:  [36.4 °C (97.6 °F)-37.1 °C (98.7 °F)] 37 °C (98.6 °F)  Pulse:  [47-81] 79  Resp:  [13-29] 20  BP: (105-133)/(61-89) 125/67  SpO2:  [93 %-97 %] 96 %    Physical Exam  Vitals reviewed.   Constitutional:       General: She is not in acute distress.     Appearance: Normal appearance. She is obese. She is not ill-appearing.   HENT:      Head: Normocephalic and atraumatic.      Nose: No congestion.   Eyes:      General:         Right eye: No discharge.         Left eye: No discharge.      Pupils: Pupils are equal, round, and reactive to light.   Cardiovascular:      Rate and Rhythm: Normal rate and regular rhythm.      Pulses: Normal pulses.      Heart sounds: Normal heart sounds. No murmur heard.  Pulmonary:      Effort: Pulmonary effort is normal. No respiratory distress.      Breath sounds: Normal breath sounds. No stridor.   Abdominal:      General: Bowel sounds are  "normal. There is no distension.      Palpations: Abdomen is soft.      Tenderness: There is no abdominal tenderness.   Musculoskeletal:         General: No swelling or tenderness. Normal range of motion.      Cervical back: Normal range of motion. No rigidity.   Skin:     General: Skin is warm.      Capillary Refill: Capillary refill takes less than 2 seconds.      Coloration: Skin is not jaundiced or pale.      Findings: No bruising.   Neurological:      General: No focal deficit present.      Mental Status: She is alert and oriented to person, place, and time.      Cranial Nerves: No cranial nerve deficit.   Psychiatric:         Mood and Affect: Mood normal.         Behavior: Behavior normal.         Fluids    Intake/Output Summary (Last 24 hours) at 3/17/2025 1636  Last data filed at 3/17/2025 1509  Gross per 24 hour   Intake 1647.99 ml   Output 200 ml   Net 1447.99 ml        Laboratory  Recent Labs     03/16/25  2356   WBC 14.1*   RBC 4.56   HEMOGLOBIN 13.8   HEMATOCRIT 40.3   MCV 88.4   MCH 30.3   MCHC 34.2   RDW 40.6   PLATELETCT 340   MPV 9.8     Recent Labs     03/16/25  2356 03/17/25  0459 03/17/25  1115   SODIUM 139 138 137   POTASSIUM 3.2* 2.8* 4.5   CHLORIDE 107 109 107   CO2 18* 19* 20   GLUCOSE 106* 117* 90   BUN 11 13 11   CREATININE 0.63 0.61 0.67   CALCIUM 8.6 8.8 8.8     Recent Labs     03/16/25  1923 03/17/25  0353 03/17/25  1115   INR 1.07 1.10 1.10               Imaging  No orders to display        Assessment/Plan  * Tylenol overdose, intentional self-harm, initial encounter (HCC)- (present on admission)  Assessment & Plan  Attempted suicide when patient took about 100 tablets of 500 mg Tylenol's at about 11 AM this morning.  Normal LFTs at outside hospital.  Tylenol level 474 at 12:45 PM at outside hospital  Check Tylenol levels every 4 hours, CMP every 12 hours    Pharmacy spoke with poison control regarding recommendations:  \"  Double terminal dose of NAC - 200 mg/kg Q16H   Add fomepizole. " "Start with loading dose of fomepizole 15 mg/kg, then transition to fomepizole 10 mg/kg Q12H until tylenol level is negative.   Collect CMP Q12H, add on VBG to assess metabolic acidosis  NAC to continue until all of the following criteria are met:  - INR < 2  - Patient clinically well  - LFTs decrease at least 25% from peak  - APAP level < 10 mcg/mL\"    Currently on legal hold.  I am continuing acetylcysteine and fomepizole infusion.  Every 4 hours Tylenol level draws and every 12 hours CMP and close monitoring in IMCU.    Obesity (BMI 30-39.9)  Assessment & Plan  BMI of 39.01  Lifestyle modifications  Outpatient follow-up.    Suicide attempt (HCC)  Assessment & Plan  Has never attempted suicide in the past  Legal hold placed  One-to-one sitter  I discussed plan of care with psychiatrist Dr. Snow.  I started her on fluvoxamine 50 mg daily  I ordered a vitamin D level, TSH and B12 level as recommended by psychiatry.  Dr. Snow patient will need GYN referral as outpatient.       I discussed plan of care during multidisciplinary rounds regarding patient's current medical condition and plan of care.    I discussed plan of care with psychiatrist Dr. Snow    Patient is critically ill.   The patient continues to have: Detectable level of Tylenol  The vital organ system that is affected is the: Hepatobiliary system  If untreated there is a high chance of deterioration into: Hepatobiliary some collapse and liver failure  And eventually death.   The critical care that I am providing today is: I am continuing acetylcysteine infusion and also continuing fomepizole infusion.  Every 4 hours Tylenol level drops and close monitoring of CMP and INR.   The critical that has been undertaken is medically complex.   There has been no overlap in critical care time.   Critical Care Time not including procedures: 36 minutes      VTE prophylaxis:    enoxaparin ppx      I have performed a physical exam and reviewed and updated ROS " and Plan today (3/17/2025). In review of yesterday's note (3/16/2025), there are no changes except as documented above.
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
## 2025-03-18 VITALS
RESPIRATION RATE: 16 BRPM | OXYGEN SATURATION: 97 % | HEIGHT: 59 IN | WEIGHT: 193.12 LBS | HEART RATE: 72 BPM | TEMPERATURE: 97.7 F | SYSTOLIC BLOOD PRESSURE: 135 MMHG | DIASTOLIC BLOOD PRESSURE: 82 MMHG | BODY MASS INDEX: 38.93 KG/M2

## 2025-03-18 PROBLEM — T39.1X2A TYLENOL OVERDOSE, INTENTIONAL SELF-HARM, INITIAL ENCOUNTER (HCC): Status: RESOLVED | Noted: 2025-03-16 | Resolved: 2025-03-18

## 2025-03-18 PROBLEM — E55.9 VITAMIN D DEFICIENCY: Status: ACTIVE | Noted: 2025-03-18

## 2025-03-18 PROBLEM — T14.91XA SUICIDE ATTEMPT (HCC): Status: RESOLVED | Noted: 2025-03-16 | Resolved: 2025-03-18

## 2025-03-18 LAB
25(OH)D3 SERPL-MCNC: 12 NG/ML (ref 30–100)
ALBUMIN SERPL BCP-MCNC: 4.1 G/DL (ref 3.2–4.9)
ALBUMIN/GLOB SERPL: 1.3 G/DL
ALP SERPL-CCNC: 74 U/L (ref 30–99)
ALT SERPL-CCNC: 41 U/L (ref 2–50)
AMPHET CTO UR CFM-MCNC: NEGATIVE NG/ML
ANION GAP SERPL CALC-SCNC: 9 MMOL/L (ref 7–16)
AST SERPL-CCNC: 26 U/L (ref 12–45)
BARBITURATES CTO UR CFM-MCNC: NEGATIVE NG/ML
BENZODIAZ CTO UR CFM-MCNC: NEGATIVE NG/ML
BILIRUB SERPL-MCNC: 0.3 MG/DL (ref 0.1–1.5)
BUN SERPL-MCNC: 13 MG/DL (ref 8–22)
CALCIUM ALBUM COR SERPL-MCNC: 9 MG/DL (ref 8.5–10.5)
CALCIUM SERPL-MCNC: 9.1 MG/DL (ref 8.5–10.5)
CANNABINOIDS CTO UR CFM-MCNC: NORMAL NG/ML
CHLORIDE SERPL-SCNC: 111 MMOL/L (ref 96–112)
CO2 SERPL-SCNC: 19 MMOL/L (ref 20–33)
COCAINE CTO UR CFM-MCNC: NEGATIVE NG/ML
CREAT SERPL-MCNC: 0.66 MG/DL (ref 0.5–1.4)
CREAT UR-MCNC: 184.4 MG/DL (ref 20–400)
DRUG COMMENT 753798: NORMAL
ERYTHROCYTE [DISTWIDTH] IN BLOOD BY AUTOMATED COUNT: 41 FL (ref 35.9–50)
FLUAV RNA SPEC QL NAA+PROBE: NEGATIVE
FLUBV RNA SPEC QL NAA+PROBE: NEGATIVE
GFR SERPLBLD CREATININE-BSD FMLA CKD-EPI: 129 ML/MIN/1.73 M 2
GLOBULIN SER CALC-MCNC: 3.2 G/DL (ref 1.9–3.5)
GLUCOSE SERPL-MCNC: 93 MG/DL (ref 65–99)
HCT VFR BLD AUTO: 42 % (ref 37–47)
HGB BLD-MCNC: 14.5 G/DL (ref 12–16)
MCH RBC QN AUTO: 30.3 PG (ref 27–33)
MCHC RBC AUTO-ENTMCNC: 34.5 G/DL (ref 32.2–35.5)
MCV RBC AUTO: 87.9 FL (ref 81.4–97.8)
METHADONE CTO UR CFM-MCNC: NEGATIVE NG/ML
OPIATES CTO UR CFM-MCNC: NORMAL NG/ML
PCP CTO UR CFM-MCNC: NEGATIVE NG/ML
PLATELET # BLD AUTO: 315 K/UL (ref 164–446)
PMV BLD AUTO: 9.7 FL (ref 9–12.9)
POTASSIUM SERPL-SCNC: 4.3 MMOL/L (ref 3.6–5.5)
PROPOXYPH CTO UR CFM-MCNC: NEGATIVE NG/ML
PROT SERPL-MCNC: 7.3 G/DL (ref 6–8.2)
RBC # BLD AUTO: 4.78 M/UL (ref 4.2–5.4)
RSV RNA SPEC QL NAA+PROBE: NEGATIVE
SARS-COV-2 RNA RESP QL NAA+PROBE: NOTDETECTED
SODIUM SERPL-SCNC: 139 MMOL/L (ref 135–145)
SPECIMEN SOURCE: NORMAL
TSH SERPL DL<=0.005 MIU/L-ACNC: 2.55 UIU/ML (ref 0.38–5.33)
VIT B12 SERPL-MCNC: 549 PG/ML (ref 211–911)
WBC # BLD AUTO: 7.5 K/UL (ref 4.8–10.8)

## 2025-03-18 PROCEDURE — 84443 ASSAY THYROID STIM HORMONE: CPT

## 2025-03-18 PROCEDURE — 82306 VITAMIN D 25 HYDROXY: CPT

## 2025-03-18 PROCEDURE — 99233 SBSQ HOSP IP/OBS HIGH 50: CPT | Mod: GC | Performed by: PSYCHIATRY & NEUROLOGY

## 2025-03-18 PROCEDURE — 0241U HCHG SARS-COV-2 COVID-19 NFCT DS RESP RNA 4 TRGT MIC: CPT

## 2025-03-18 PROCEDURE — A9270 NON-COVERED ITEM OR SERVICE: HCPCS | Performed by: HOSPITALIST

## 2025-03-18 PROCEDURE — 90837 PSYTX W PT 60 MINUTES: CPT | Performed by: SOCIAL WORKER

## 2025-03-18 PROCEDURE — RXMED WILLOW AMBULATORY MEDICATION CHARGE: Performed by: HOSPITALIST

## 2025-03-18 PROCEDURE — 700102 HCHG RX REV CODE 250 W/ 637 OVERRIDE(OP): Performed by: HOSPITALIST

## 2025-03-18 PROCEDURE — 82607 VITAMIN B-12: CPT

## 2025-03-18 PROCEDURE — 85027 COMPLETE CBC AUTOMATED: CPT

## 2025-03-18 PROCEDURE — 99239 HOSP IP/OBS DSCHRG MGMT >30: CPT | Performed by: HOSPITALIST

## 2025-03-18 PROCEDURE — 80053 COMPREHEN METABOLIC PANEL: CPT

## 2025-03-18 RX ORDER — FLUVOXAMINE MALEATE 50 MG
50 TABLET ORAL EVERY EVENING
Qty: 30 TABLET | Refills: 0 | Status: SHIPPED | OUTPATIENT
Start: 2025-03-18

## 2025-03-18 RX ORDER — VITAMIN B COMPLEX
1000 TABLET ORAL DAILY
Status: DISCONTINUED | OUTPATIENT
Start: 2025-03-18 | End: 2025-03-18 | Stop reason: HOSPADM

## 2025-03-18 RX ADMIN — Medication 1000 UNITS: at 10:12

## 2025-03-18 ASSESSMENT — PATIENT HEALTH QUESTIONNAIRE - PHQ9
9. THOUGHTS THAT YOU WOULD BE BETTER OFF DEAD, OR OF HURTING YOURSELF: SEVERAL DAYS
CLINICAL INTERPRETATION OF PHQ2 SCORE: 4
3. TROUBLE FALLING OR STAYING ASLEEP OR SLEEPING TOO MUCH: MORE THAN HALF THE DAYS
SUM OF ALL RESPONSES TO PHQ QUESTIONS 1-9: 15
7. TROUBLE CONCENTRATING ON THINGS, SUCH AS READING THE NEWSPAPER OR WATCHING TELEVISION: MORE THAN HALF THE DAYS
4. FEELING TIRED OR HAVING LITTLE ENERGY: MORE THAN HALF THE DAYS
SUM OF ALL RESPONSES TO PHQ9 QUESTIONS 1 AND 2: 4
5. POOR APPETITE OR OVEREATING: MORE THAN HALF THE DAYS
2. FEELING DOWN, DEPRESSED, IRRITABLE, OR HOPELESS: MORE THAN HALF THE DAYS
1. LITTLE INTEREST OR PLEASURE IN DOING THINGS: MORE THAN HALF THE DAYS
6. FEELING BAD ABOUT YOURSELF - OR THAT YOU ARE A FAILURE OR HAVE LET YOURSELF OR YOUR FAMILY DOWN: SEVERAL DAYS
5. POOR APPETITE OR OVEREATING: 0 - NOT AT ALL
SUM OF ALL RESPONSES TO PHQ QUESTIONS 1-9: 10
8. MOVING OR SPEAKING SO SLOWLY THAT OTHER PEOPLE COULD HAVE NOTICED. OR THE OPPOSITE, BEING SO FIGETY OR RESTLESS THAT YOU HAVE BEEN MOVING AROUND A LOT MORE THAN USUAL: SEVERAL DAYS

## 2025-03-18 ASSESSMENT — ANXIETY QUESTIONNAIRES
GAD7 TOTAL SCORE: 11
7. FEELING AFRAID AS IF SOMETHING AWFUL MIGHT HAPPEN: SEVERAL DAYS
4. TROUBLE RELAXING: SEVERAL DAYS
1. FEELING NERVOUS, ANXIOUS, OR ON EDGE: NEARLY EVERY DAY
1. FEELING NERVOUS, ANXIOUS, OR ON EDGE: NEARLY EVERY DAY
2. NOT BEING ABLE TO STOP OR CONTROL WORRYING: NEARLY EVERY DAY
3. WORRYING TOO MUCH ABOUT DIFFERENT THINGS: NEARLY EVERY DAY
5. BEING SO RESTLESS THAT IT IS HARD TO SIT STILL: MORE THAN HALF THE DAYS
5. BEING SO RESTLESS THAT IT IS HARD TO SIT STILL: NOT AT ALL
6. BECOMING EASILY ANNOYED OR IRRITABLE: MORE THAN HALF THE DAYS
2. NOT BEING ABLE TO STOP OR CONTROL WORRYING: MORE THAN HALF THE DAYS
4. TROUBLE RELAXING: MORE THAN HALF THE DAYS
IF YOU CHECKED OFF ANY PROBLEMS ON THIS QUESTIONNAIRE, HOW DIFFICULT HAVE THESE PROBLEMS MADE IT FOR YOU TO DO YOUR WORK, TAKE CARE OF THINGS AT HOME, OR GET ALONG WITH OTHER PEOPLE: VERY DIFFICULT
7. FEELING AFRAID AS IF SOMETHING AWFUL MIGHT HAPPEN: SEVERAL DAYS
6. BECOMING EASILY ANNOYED OR IRRITABLE: SEVERAL DAYS
IF YOU CHECKED OFF ANY PROBLEMS ON THIS QUESTIONNAIRE, HOW DIFFICULT HAVE THESE PROBLEMS MADE IT FOR YOU TO DO YOUR WORK, TAKE CARE OF THINGS AT HOME, OR GET ALONG WITH OTHER PEOPLE: SOMEWHAT DIFFICULT
GAD7 TOTAL SCORE: 16
3. WORRYING TOO MUCH ABOUT DIFFERENT THINGS: NEARLY EVERY DAY

## 2025-03-18 ASSESSMENT — PAIN DESCRIPTION - PAIN TYPE
TYPE: ACUTE PAIN

## 2025-03-18 ASSESSMENT — ENCOUNTER SYMPTOMS
ABDOMINAL PAIN: 0
BACK PAIN: 0
LOSS OF CONSCIOUSNESS: 0
DIZZINESS: 0
PALPITATIONS: 0
COUGH: 0
VOMITING: 0
NAUSEA: 0
FEVER: 0
HEADACHES: 0
CHILLS: 0
DIARRHEA: 0
SHORTNESS OF BREATH: 0

## 2025-03-18 ASSESSMENT — FIBROSIS 4 INDEX: FIB4 SCORE: 0.22

## 2025-03-18 NOTE — DISCHARGE PLANNING
Legal Hold Transfer     Referral: Legal hold transfer to Mental Health Facility     Intervention: Notified by  Angelica that pt has been accepted to [facility].     Pt's accepting physcian is Dr. Bagley     Transport arranged through Lisa at [facility]     The pt will be picked up at 1700      Notified Bedside RN Marybeth, CARISSA West, and Dr. Salazar of the departure time as well as accepting facility.      Transfer packet to be created with original legal hold and placed on chart by MSW.     Plan: Pt will be transferring to [facility] today at 1700 via [facility].

## 2025-03-18 NOTE — CONSULTS
"PSYCHIATRIC FOLLOW-UP:(established)  *Reason for admission: overdose        *Legal Hold Status:   on hold, certified.         Chart reviewed.         *HPI:          Psychiatry last followed up 3/17/25, and at that time, Fluvoxamine 50mg PO every evening was started for depression. Patient adhering to medication regimen, and no psychotropic PRNs required overnight.     She reports sad this morning, and she's been missing boyfriend. She doesn't know if they are broken up or not. They've been together for 1.5 years. She notes bad separation anxiety. She has been feeling off for the past 2 weeks in the setting of more conflict with boyfriend. Feels sensation in the back of her through after being triggered by seeing Red Bull in her room, that was the same drink that she used to overdose. Was living with boyfriend with his grandmother, belongings mostly at her mother's. Would live with father and stepmother if discharged, but alternative with her grandmother as sometimes they stress her out. Biological mother here visiting.    Mood:  \"sad\"  Sleep: can't sleep, because of thinking about boyfriend.  Appetite: appetite is poor  Energy:poor/  Denies SI/HI, last SI was overdose   Denies A/V hallucinations    Side effects: heart rate faster than usual, would lose train of thought. Denies issues today.    OCD - denies symptoms today  PTSD symptoms: a lot of flashbacks today around overdose     PMDD  1-2 weeks prior to menstruation, improves after period starts  Lability: endorses mood swings  Irritability: endorses  Negative: endorses    Anhedonia: denies  Focus: endorses  Energy: low  Appetite: denies issues  Sleep: denies issues  Overwhelmed: endorses  Physical symptoms: bloated, cramping.  Function: affects motivation for school    Collateral: mother (biological)  Mother concerned not safe to discharge. boyfriend just a trigger. Thinks that she'll go home with him. Wondering if they should have supervised face to face. No " prior SA. Has made statements feeling too overwhelmed with life in regards since HS especially with relationships.  boyfriend, same with girlfriend. Would be obsessive and clinging emotionally. No firearm access.      Medical ROS (as pertinent):     ROS        *Psychiatric Examination:  Vitals:    03/18/25 0700   BP:    Pulse: (!) 55   Resp: (!) 24   Temp: 36.9 °C (98.4 °F)   SpO2: 94%     Appearance: appears stated age, obese, fair grooming and hygiene, calm, cooperative, good eye contact  Abnormal movements: none  Gait/posture: normal  Speech: normal volume, tone and rhythm  Though process: linear and goal oriented  Associations: no loose associations  Thought content: denies AVH, no delusions or paranoia elicited, does not appear to be responding to internal stimuli, neither is internally preoccupied.   Judgement and Insight: fair/fair  Orientation:oriented to person, place, time and situation  Recent and Remote Memory: intact  Attention Span and Concentration: intact  Language: fluid   Fund of Knowledge: not tested   Mood and Affect:depressed, tearful  SI/HI:denies any active or passive SI/HI      EKG: , SR  Brain Imaging: none available   EEG:  Not done      *Labs personally reviewed:   Recent Results (from the past 24 hours)   URINE DRUG SCREEN W/CONF (SEND OUT)    Collection Time: 03/17/25  8:00 AM   Result Value Ref Range    Urine Amphetamine-Methamphetam Negative Cutoff 300 ng/mL    Barbiturates Negative Cutoff 200 ng/mL    Benzodiazepines Negative Cutoff 200 ng/mL    Propoxyphene Negative Cutoff 300 ng/mL    Cocaine Metabolite Negative Cutoff 150 ng/mL    Methadone Negative Cutoff 150 ng/mL    Codeine-Morphine PresumptivePOS Cutoff 300 ng/mL    Phencyclidine -Pcp Negative Cutoff 25 ng/mL    Cannabinoid Metab PresumptivePOS Cutoff 50 ng/mL    Creatinine Urine 184.4 20.0 - 400.0 mg/dL    Drug Comment Urine Drugs See Note    HCG QUALITATIVE UR    Collection Time: 03/17/25  8:00 AM   Result Value Ref  Range    Beta-Hcg Urine Negative Negative   URINALYSIS    Collection Time: 03/17/25  8:00 AM   Result Value Ref Range    Color Yellow     Character Clear     Specific Gravity >1.045 <1.035    Ph 5.5 5.0 - 8.0    Glucose Negative Negative mg/dL    Ketones 15 (A) Negative mg/dL    Protein Negative Negative mg/dL    Bilirubin Negative Negative    Urobilinogen, Urine 1.0 <=1.0 EU/dL    Nitrite Negative Negative    Leukocyte Esterase Negative Negative    Occult Blood Negative Negative    Micro Urine Req see below    ACETAMINOPHEN    Collection Time: 03/17/25 11:15 AM   Result Value Ref Range    Acetaminophen -Tylenol 8.2 (L) 10.0 - 30.0 ug/mL   Prothrombin Time    Collection Time: 03/17/25 11:15 AM   Result Value Ref Range    PT 14.2 12.0 - 14.6 sec    INR 1.10 0.87 - 1.13   Comp Metabolic Panel    Collection Time: 03/17/25 11:15 AM   Result Value Ref Range    Sodium 137 135 - 145 mmol/L    Potassium 4.5 3.6 - 5.5 mmol/L    Chloride 107 96 - 112 mmol/L    Co2 20 20 - 33 mmol/L    Anion Gap 10.0 7.0 - 16.0    Glucose 90 65 - 99 mg/dL    Bun 11 8 - 22 mg/dL    Creatinine 0.67 0.50 - 1.40 mg/dL    Calcium 8.8 8.5 - 10.5 mg/dL    Correct Calcium 8.8 8.5 - 10.5 mg/dL    AST(SGOT) 21 12 - 45 U/L    ALT(SGPT) 38 2 - 50 U/L    Alkaline Phosphatase 66 30 - 99 U/L    Total Bilirubin 0.3 0.1 - 1.5 mg/dL    Albumin 4.0 3.2 - 4.9 g/dL    Total Protein 6.9 6.0 - 8.2 g/dL    Globulin 2.9 1.9 - 3.5 g/dL    A-G Ratio 1.4 g/dL   ESTIMATED GFR    Collection Time: 03/17/25 11:15 AM   Result Value Ref Range    GFR (CKD-EPI) 128 >60 mL/min/1.73 m 2   ACETAMINOPHEN    Collection Time: 03/17/25  4:00 PM   Result Value Ref Range    Acetaminophen -Tylenol <5.0 (L) 10.0 - 30.0 ug/mL   Comp Metabolic Panel    Collection Time: 03/17/25  4:00 PM   Result Value Ref Range    Sodium 138 135 - 145 mmol/L    Potassium 4.8 3.6 - 5.5 mmol/L    Chloride 114 (H) 96 - 112 mmol/L    Co2 14 (L) 20 - 33 mmol/L    Anion Gap 10.0 7.0 - 16.0    Glucose 98 65 - 99  mg/dL    Bun 11 8 - 22 mg/dL    Creatinine 0.69 0.50 - 1.40 mg/dL    Calcium 8.9 8.5 - 10.5 mg/dL    Correct Calcium 9.0 8.5 - 10.5 mg/dL    AST(SGOT) 24 12 - 45 U/L    ALT(SGPT) 37 2 - 50 U/L    Alkaline Phosphatase 73 30 - 99 U/L    Total Bilirubin 0.3 0.1 - 1.5 mg/dL    Albumin 3.9 3.2 - 4.9 g/dL    Total Protein 7.0 6.0 - 8.2 g/dL    Globulin 3.1 1.9 - 3.5 g/dL    A-G Ratio 1.3 g/dL   ESTIMATED GFR    Collection Time: 25  4:00 PM   Result Value Ref Range    GFR (CKD-EPI) 127 >60 mL/min/1.73 m 2   EKG    Collection Time: 25  7:57 PM   Result Value Ref Range    Report       [facility]    Test Date:  2025  Pt Name:    VIJAY AGUILAR         Department: [facility]  MRN:        [id number]                      Room:  Gender:     F                            Technician: [facility]  :        2005                   Requested By:YUSEF DUARTE  Order #:    [id number]                    Reading MD: Basim Celis MD    Measurements  Intervals                                Axis  Rate:       70                           P:          46  VA:         165                          QRS:        39  QRSD:       98                           T:          10  QT:         410  QTc:        443    Interpretive Statements  Sinus rhythm  Nonspecific T abnormalities, anterior leads  No previous ECG available for comparison  Electronically Signed On 2025 19:57:36 PDT by Basim Celis MD     TSH WITH REFLEX TO FT4    Collection Time: 25  5:00 AM   Result Value Ref Range    TSH 2.550 0.380 - 5.330 uIU/mL   VITAMIN B12    Collection Time: 25  5:00 AM   Result Value Ref Range    Vitamin B12 -True Cobalamin 549 211 - 911 pg/mL   VITAMIN D,25 HYDROXY (DEFICIENCY)    Collection Time: 25  5:00 AM   Result Value Ref Range    25-Hydroxy   Vitamin D 25 12 (L) 30 - 100 ng/mL   CBC WITHOUT DIFFERENTIAL    Collection Time: 25  5:00 AM   Result Value Ref Range    WBC 7.5 4.8 - 10.8 K/uL    RBC 4.78 4.20  - 5.40 M/uL    Hemoglobin 14.5 12.0 - 16.0 g/dL    Hematocrit 42.0 37.0 - 47.0 %    MCV 87.9 81.4 - 97.8 fL    MCH 30.3 27.0 - 33.0 pg    MCHC 34.5 32.2 - 35.5 g/dL    RDW 41.0 35.9 - 50.0 fL    Platelet Count 315 164 - 446 K/uL    MPV 9.7 9.0 - 12.9 fL   Comp Metabolic Panel    Collection Time: 03/18/25  5:00 AM   Result Value Ref Range    Sodium 139 135 - 145 mmol/L    Potassium 4.3 3.6 - 5.5 mmol/L    Chloride 111 96 - 112 mmol/L    Co2 19 (L) 20 - 33 mmol/L    Anion Gap 9.0 7.0 - 16.0    Glucose 93 65 - 99 mg/dL    Bun 13 8 - 22 mg/dL    Creatinine 0.66 0.50 - 1.40 mg/dL    Calcium 9.1 8.5 - 10.5 mg/dL    Correct Calcium 9.0 8.5 - 10.5 mg/dL    AST(SGOT) 26 12 - 45 U/L    ALT(SGPT) 41 2 - 50 U/L    Alkaline Phosphatase 74 30 - 99 U/L    Total Bilirubin 0.3 0.1 - 1.5 mg/dL    Albumin 4.1 3.2 - 4.9 g/dL    Total Protein 7.3 6.0 - 8.2 g/dL    Globulin 3.2 1.9 - 3.5 g/dL    A-G Ratio 1.3 g/dL   ESTIMATED GFR    Collection Time: 03/18/25  5:00 AM   Result Value Ref Range    GFR (CKD-EPI) 129 >60 mL/min/1.73 m 2       Current medications:  Current Facility-Administered Medications   Medication Dose Route Frequency Provider Last Rate Last Admin    fluvoxAMINE (Luvox) tablet 50 mg  50 mg Oral Q EVENING Agustina Josih M.D.   50 mg at 03/17/25 2115    enoxaparin (Lovenox) inj 40 mg  40 mg Subcutaneous DAILY AT 1800 Sadi Zelaya M.D.   40 mg at 03/17/25 1847    labetalol (Normodyne/Trandate) injection 10 mg  10 mg Intravenous Q4HRS PRN Sadi Zelaya M.D.        ondansetron (Zofran) syringe/vial injection 4 mg  4 mg Intravenous Q4HRS PRN Sadi Zelaya M.D.        ondansetron (Zofran ODT) dispertab 4 mg  4 mg Oral Q4HRS PRN Sadi Zelaya M.D.        promethazine (Phenergan) tablet 12.5-25 mg  12.5-25 mg Oral Q4HRS PRN Sadi Zelaya M.D.        promethazine (Phenergan) suppository 12.5-25 mg  12.5-25 mg Rectal Q4HRS PRN Sadi Zelaya M.D.        prochlorperazine (Compazine) injection  5-10 mg  5-10 mg Intravenous Q4HRS PRN Sadi Zelaya M.D.        HYDROmorphone (Dilaudid) injection 1 mg  1 mg Intravenous Q4HRS PRN Sadi Zelaya M.D.   1 mg at 03/17/25 0254       Assessment:   Pt attempted suicide following emotional distress related to break up with boyfriend. Pt is endorsing symptoms that meet criteria for major depressive disorder. She has several symptoms consistent with borderline personality disorder. Discussed this possible diagnosis and treatment with DBT. Likely meets criteria for PMDD however she should complete journal to track symptoms prior to formal diagnosis made. Mother collateral does not recall symptoms consistent with bipolar disorder.  No AVH. She continues to report depressed mood, with tearful affect. Mother does not feel comfortable with her being discharged at this time. Tolerating fluvoxamine well, will continue at this time.    Dx:  PTSD  OCD  MDD  Cannabis use  Borderline Personality Disorder  Likely PMDD    Medical :  Principal Problem:    Tylenol overdose, intentional self-harm, initial encounter ([facility]) (POA: Yes)  Active Problems:    Suicide attempt ([facility]) (POA: Unknown)    Obesity (BMI 30-39.9) (POA: Unknown)  Resolved Problems:    * No resolved hospital problems. *    Plan:  1- Legal hold:extended   2- Psychotropic medications:   - continue Fluvoxamine 50mg PO every evening for depression.   -continue vitamin D supplementation  3- Please transfer pt to inpatient psychiatric hospital when medically cleared and bed is available  4- Labs ordered/reviewed: low vitamin D, B12 within normal limits, TSH within normal limits   5- EKG /reviewed  6- Old records /reviewed/summarized  7- Collateral obtained mother  9- Pt should receive referral to OBGYN upon discharge   10-Referred to psychotherapy with Dr Leiva   11- Discussed the case with:Dr. Salazar,  12- Psychiatry signing off as patient transferring to psychiatry facility    Thank you for the consult.      Sitter:1:1  Phone:hospital phone to speak with family only, supervised   Visitors:family only, supervised  Personal belongings: no

## 2025-03-18 NOTE — DISCHARGE PLANNING
[facility] ALERT TEAM DISCHARGE PLANNING NOTE    Date:  03/18/2025  Patient Name:  Dior Craft - 19 y.o. - Discharge Planning  MRN:  [id number]   YOB: 2005  ADMISSION DATE:  3/16/2025     Writer forwarded referral packet for inpatient psychiatric care to the following community providers:  [facility], [facility], [facility], and  [facility].     Items included in the referral packet:   __x___Face Sheet   ___x__Pages 1 and 2 of completed legal hold   ___x__Alert Team/Psych Assessment   __x___H&P   __x___UDS   _____Blood Alcohol   ___x__Vital signs   __x___Pregnancy Test (if applicable)   __x___Medications List   _____Covid Screen

## 2025-03-18 NOTE — CARE PLAN
The patient is Stable - Low risk of patient condition declining or worsening    Shift Goals  Clinical Goals: Safety, monitor electrolytes  Patient Goals: Sleep  Family Goals: Updates    Progress made toward(s) clinical / shift goals:      Problem: Knowledge Deficit - Standard  Goal: Patient and family/care givers will demonstrate understanding of plan of care, disease process/condition, diagnostic tests and medications  Outcome: Progressing     Problem: Provide Safe Environment  Goal: Suicide environmental safety, protocols, policies, and practices will be implemented  Outcome: Progressing     Problem: Psychosocial  Goal: Patient's ability to identify and develop effective coping behaviors will improve  Outcome: Progressing     Problem: Depression  Goal: Patient and family/caregiver will verbalize accurate information about at least two of the possible causes of depression, three-four of the signs and symptoms of depression  Outcome: Progressing     Problem: Pain - Standard  Goal: Alleviation of pain or a reduction in pain to the patient’s comfort goal  Outcome: Progressing       Patient is not progressing towards the following goals:

## 2025-03-18 NOTE — PROGRESS NOTES
Pt admitted to room T426 via transport in hospital bed from [facility] at 1031. Report received from KVNG Workman.      Patient reports pain at 0 on a scale of 0-10. Educated patient regarding pharmacologic and non pharmacologic modalities for pain management. Oriented to room call light and smoking policy.  Reviewed plan of care (equipment, incentive spirometer, sequential compression devices, medications, activity, diet, fall precautions, skin care, and pain) with patient and family. Welcome packet given and reviewed with patient, all questions answered. Education provided on oral hygiene program.     AA&Ox4. Denies CP/SOB.  See 2 RN skin note  Tolerating regular diet. Denies N/V.  + void + BM. Last BM 3/18  Pt ambulates stand by    All needs met at this time. Call light within reach. Pt calls appropriately. Bed low and locked, non skid socks in place. Hourly rounding in place.     2 bags of belongings in the legal hold care coordination room.

## 2025-03-18 NOTE — PROGRESS NOTES
4 Eyes Skin Assessment Completed by KVNG Rueda and KVNG Morales.    Head WDL  Ears WDL  Nose WDL  Mouth WDL  Neck WDL  Breast/Chest WDL  Shoulder Blades WDL  Spine WDL  (R) Arm/Elbow/Hand previous iv poke sites  (L) Arm/Elbow/Hand previous IV poke sites  Abdomen WDL  Groin WDL  Scrotum/Coccyx/Buttocks WDL  (R) Leg WDL  (L) Leg WDL  (R) Heel/Foot/Toe WDL  (L) Heel/Foot/Toe WDL            Possible Skin Injury No    Pictures Uploaded Into Epic N/A  Wound Consult Placed N/A  RN Wound Prevention Protocol Ordered No

## 2025-03-18 NOTE — DISCHARGE PLANNING
Alert Team Note     @9711 Spoke to KVNG Will at [facility] who requested additional information regarding medical clearance. Informed Nicolette that pt is medically clear. Was advised they will most likely accept pt and  Christiano will reach out shortly    @2201 Spoke to Christiano at [facility] requesting a COVID test, court petition, and HCG. Will fax HCG and COVID once Covid tests result. Alert team SHYAM Ortiz faxed court petition to [facility].     @1475 Spoke to [facility]o accepting pt pending negative COVID test. Accepting is Dr. Bagley. Requesting transfer today at 1730    Notified bedside VKNG Rueda, Dr. Salazar, OPHELIA West, Alert Team OPHELIA Barba, and Alert Team SHYAM Ortiz

## 2025-03-18 NOTE — DISCHARGE PLANNING
Case Management Discharge Planning    Admission Date: 3/16/2025  GMLOS: 2.5  ALOS: 2    6-Clicks ADL Score: 24  6-Clicks Mobility Score: 24      Anticipated Discharge Dispo:      DME Needed: No    Action(s) Taken: COBRA completed for transfer to  specialty. Bedside RN notified.    Escalations Completed: None    Medically Clear: Yes

## 2025-03-18 NOTE — PROGRESS NOTES
Pharmacy Note: Poison control updated for acetaminophen overdose    Poison control case #:[id number]    Labs:  Recent Labs     03/16/25  1923 03/16/25  2356 03/16/25  2356 03/17/25  0353 03/17/25  0459 03/17/25  1115 03/17/25  1600   SODIUM  --  139   < >  --  138 137 138   POTASSIUM  --  3.2*   < >  --  2.8* 4.5 4.8   CHLORIDE  --  107   < >  --  109 107 114*   CO2  --  18*   < >  --  19* 20 14*   BUN  --  11   < >  --  13 11 11   CREATININE  --  0.63   < >  --  0.61 0.67 0.69   GLUCOSE  --  106*   < >  --  117* 90 98   CALCIUM  --  8.6   < >  --  8.8 8.8 8.9   MAGNESIUM  --  2.2  --   --   --   --   --    ASTSGOT  --  26   < >  --  25 21 24   ALTSGPT  --  40   < >  --  42 38 37   ALBUMIN  --  4.1   < >  --  4.0 4.0 3.9   TBILIRUBIN  --  0.3   < >  --  0.3 0.3 0.3   INR 1.07  --   --  1.10  --  1.10  --     < > = values in this interval not displayed.            Levels:   Latest Reference Range & Units 03/17/25 02:39 03/17/25 04:59 03/17/25 11:15 03/17/25 16:00   Acetaminophen -Tylenol 10.0 - 30.0 ug/mL 39.8 (H) 22.6 8.2 (L) <5.0 (L)   (H): Data is abnormally high  (L): Data is abnormally low    Recommended Plan:  1. Discontinue NAC and fomepizole.     Reese Livingston, SamD

## 2025-03-18 NOTE — PROGRESS NOTES
Hospital Medicine Daily Progress Note    Date of Service  3/18/2025    Chief Complaint  Dior Craft is a 19 y.o. female admitted 3/16/2025 with suicide attempt    Hospital Course  20yo who presented after an intentional APAP OD of approximately 100 tabs of 500mg.    Interval Problem Update  No physical complaints.  Feels embarassed she's here and hoping to go home    I have discussed this patient's plan of care and discharge plan at IDT rounds today with Case Management, Nursing, Nursing leadership, and other members of the IDT team.    Consultants/Specialty  physiatry    Code Status  Full Code    Disposition  The patient is medically cleared for discharge to home or a post-acute facility.      I have placed the appropriate orders for post-discharge needs.    Review of Systems  Review of Systems   Constitutional:  Negative for chills and fever.   Respiratory:  Negative for cough and shortness of breath.    Cardiovascular:  Negative for chest pain, palpitations and leg swelling.   Gastrointestinal:  Negative for abdominal pain, diarrhea, nausea and vomiting.   Genitourinary:  Negative for dysuria and urgency.   Musculoskeletal:  Negative for back pain.   Skin:  Negative for rash.   Neurological:  Negative for dizziness, loss of consciousness and headaches.        Physical Exam  Temp:  [36.8 °C (98.2 °F)-37 °C (98.6 °F)] (P) 36.9 °C (98.5 °F)  Pulse:  [54-80] 63  Resp:  [13-37] 37  BP: (100-129)/(62-83) 128/72  SpO2:  [93 %-97 %] 95 %    Physical Exam  Constitutional:       General: She is not in acute distress.     Appearance: Normal appearance. She is well-developed. She is not diaphoretic.   HENT:      Head: Normocephalic and atraumatic.   Neck:      Vascular: No JVD.   Cardiovascular:      Rate and Rhythm: Normal rate and regular rhythm.      Heart sounds: No murmur heard.  Pulmonary:      Effort: Pulmonary effort is normal. No respiratory distress.      Breath sounds: No stridor. No wheezing or rales.    Abdominal:      Palpations: Abdomen is soft.      Tenderness: There is no abdominal tenderness. There is no guarding or rebound.   Skin:     General: Skin is warm and dry.      Findings: No rash.   Neurological:      Mental Status: She is oriented to person, place, and time.   Psychiatric:         Mood and Affect: Mood normal.         Behavior: Behavior normal.         Thought Content: Thought content normal.      Comments: Denies suicidal ideation or plan         Fluids    Intake/Output Summary (Last 24 hours) at 3/18/2025 0712  Last data filed at 3/17/2025 1833  Gross per 24 hour   Intake 780 ml   Output 200 ml   Net 580 ml        Laboratory  Recent Labs     03/16/25  2356 03/18/25  0500   WBC 14.1* 7.5   RBC 4.56 4.78   HEMOGLOBIN 13.8 14.5   HEMATOCRIT 40.3 42.0   MCV 88.4 87.9   MCH 30.3 30.3   MCHC 34.2 34.5   RDW 40.6 41.0   PLATELETCT 340 315   MPV 9.8 9.7     Recent Labs     03/17/25  1115 03/17/25  1600 03/18/25  0500   SODIUM 137 138 139   POTASSIUM 4.5 4.8 4.3   CHLORIDE 107 114* 111   CO2 20 14* 19*   GLUCOSE 90 98 93   BUN 11 11 13   CREATININE 0.67 0.69 0.66   CALCIUM 8.8 8.9 9.1     Recent Labs     03/16/25  1923 03/17/25  0353 03/17/25  1115   INR 1.07 1.10 1.10               Imaging  No orders to display        Assessment/Plan  * Tylenol overdose, intentional self-harm, initial encounter (HCC)- (present on admission)  Assessment & Plan  Attempted suicide when patient took about 100 tablets of 500 mg Tylenol's at about 11 AM this morning.  Normal LFTs at outside hospital.  Tylenol level 474 at 12:45 PM at outside hospital  Check Tylenol levels every 4 hours, CMP every 12 hours    Has now completed NAC/Fomepazole protocol  LFTs and coag's are normal  Pt with no complaints  [facility]    Vitamin D deficiency  Assessment & Plan  Start po supplementation    Obesity (BMI 30-39.9)  Assessment & Plan  BMI of 39.01  Lifestyle  modifications  Outpatient follow-up.    Suicide attempt (HCC)  Assessment & Plan  Has never attempted suicide in the past  Legal hold placed  One-to-one sitter  I discussed plan of care with psychiatrist Dr. Snow.  I started her on fluvoxamine 50 mg daily           VTE prophylaxis: VTE Selection    I have performed a physical exam and reviewed and updated ROS and Plan today (3/18/2025). In review of yesterday's note (3/17/2025), there are no changes except as documented above.

## 2025-03-18 NOTE — PROGRESS NOTES
Clarified potassium replacement with Dr Joshi as well as Acetaminophen level returning at < 5, see new orders.

## 2025-03-18 NOTE — DISCHARGE PLANNING
Case Management Discharge Planning    Admission Date: 3/16/2025  GMLOS: 2.5  ALOS: 2    6-Clicks ADL Score: 24  6-Clicks Mobility Score: 24      Anticipated Discharge Dispo:      DME Needed: No    Action(s) Taken: LMSW faxed legal hold paperwork to alert team as requested by Diana Thompson.    1206  Diana Thompson requesting D.O. to sign section two, medical clearance of the legal hold paperwork.  LMSW messaged Dr. Salazar who reported he will come by in about an hour to sign the papers for LMSW to resend to Diana Thompson.     Escalations Completed: None    Medically Clear: Yes    Next Steps: Referrals be sent to  behavioral health facilities

## 2025-03-18 NOTE — DISCHARGE PLANNING
Legal Hold    Referral: Legal Hold Court     Intervention: Pt presented for legal hold meeting with  via phone call,  advised pt will meet with court MD's via telemedicine monitor to contest the legal hold.      Plan: Pt will present to telemedicine mental health court tomorrow 3/19 at [facility] due to upcoming transfer to facility at 1730.     Lucinda notified of patient's upcoming transfer to [facility].

## 2025-03-18 NOTE — DISCHARGE PLANNING
Filed petition to the court via [employer]. Waiting on verified petition.

## 2025-03-18 NOTE — PROGRESS NOTES
Report given to KVNG Rueda, pt transferred via wheelchair with transport, belongings, legal hold paperwork and safety sitter with patient. Cherry updated via phone of pt moving to room T426. Questions answered.

## 2025-03-18 NOTE — DISCHARGE PLANNING
Received Verified Involuntary Court Ordered Petition from the court. Scanned copy of Petition into pt's chart.  Sent copy to [facility].

## 2025-03-18 NOTE — DISCHARGE SUMMARY
Discharge Summary    CHIEF COMPLAINT ON ADMISSION  No chief complaint on file.      Reason for Admission  Medical (OD)     Admission Date  3/16/2025    CODE STATUS  Full Code    HPI & HOSPITAL COURSE  This is a 19 y.o. female here with no previous medical history.  Patient had an unexpected break-up with her significant other, and then apparently drove to [facility] where she bought a bottle of Tylenol and took approximately 100 tablets of 500 mg strength.  She then called her boyfriend, who picked her up and brought her to the emergency room.  In the ED on presentation her AST was 23 ALT 41 alk phos 91, INR 1.2.  Tylenol level was 474 approximately 2 hours from the ingestion.  Other labs of significant included a CBC with a white count of 14.1 otherwise normal, negative pregnancy test, vitamin D 12, vitamin B12 549, TSH 2.550, COVID testing negative    Case was discussed with the toxicology center who recommended initiation of NAC protocol with addition of fomepizole.  Patient was then admitted to the [facility].  Fortune the patient has since done well.  On the day of discharge, her LFTs remain normal, Tylenol level is now undetectable and INR is 1.10.  Patient has remained essentially asymptomatic.  She has completed her IV NAC, and fomepizole.  She has been seen by the psychiatry team who are recommending transfer to an inpatient facility for further therapy.  She will be discharged later today to an inpatient psychiatric hospital.  No notes on file    Therefore, she is discharged in good and stable condition to home with close outpatient follow-up.    The patient recovered much more quickly than anticipated on admission.    Discharge Date  3/18/2025    FOLLOW UP ITEMS POST DISCHARGE  With inpatient psychiatry team    DISCHARGE DIAGNOSES  Principal Problem (Resolved):    Tylenol overdose, intentional self-harm, initial encounter ([facility]) (POA: Yes)  Active Problems:    Obesity (BMI 30-39.9) (POA: Unknown)    Vitamin D deficiency  (POA: Unknown)  Resolved Problems:    Suicide attempt (HCC) (POA: Unknown)      FOLLOW UP  No future appointments.  No follow-up provider specified.    MEDICATIONS ON DISCHARGE     Medication List      You have not been prescribed any medications.         Allergies  No Known Allergies    DIET  Orders Placed This Encounter   Procedures    Diet Order Diet: Regular; Tray Modifications (optional): Safety Tray - Plastic dishware, utensils unwrapped (Suicide Watch)     Paperware only on meal tray.     Standing Status:   Standing     Number of Occurrences:   1     Diet::   Regular [1]     Tray Modifications (optional):   Safety Tray - Plastic dishware, utensils unwrapped (Suicide Watch)       ACTIVITY  As tolerated.  Weight bearing as tolerated    CONSULTATIONS  Psychiatry    PROCEDURES  None    LABORATORY  Lab Results   Component Value Date    SODIUM 139 03/18/2025    POTASSIUM 4.3 03/18/2025    CHLORIDE 111 03/18/2025    CO2 19 (L) 03/18/2025    GLUCOSE 93 03/18/2025    BUN 13 03/18/2025    CREATININE 0.66 03/18/2025        Lab Results   Component Value Date    WBC 7.5 03/18/2025    HEMOGLOBIN 14.5 03/18/2025    HEMATOCRIT 42.0 03/18/2025    PLATELETCT 315 03/18/2025        Total time of the discharge process exceeds 35 minutes.  I spent most of that time with the patient reviewing discharge plan instructions.

## 2025-03-18 NOTE — DIETARY
"Nutrition Services: Initial Assessment     Day 2 of admit. Dior Craft is 19 y.o., female with admitting DX of Tylenol overdose, intentional self-harm, initial encounter ([facility]) [T39.1X2A].    Consult Received for: MST - Malnutrition Screening Tool    Principal Problem:    Tylenol overdose, intentional self-harm, initial encounter ([facility]) (POA: Yes)  Active Problems:    Suicide attempt ([facility]) (POA: Unknown)    Obesity (BMI 30-39.9) (POA: Unknown)    Vitamin D deficiency (POA: Unknown)  Resolved Problems:    * No resolved hospital problems. *      Nutrition Assessment:      Height: 149 cm (4' 10.66\")  Weight: 87.6 kg (193 lb 2 oz)  Weight taken via: Bed Scale  BMI Calculated: 39.46 kg/m2  BMI Classification: Obesity Class 2       Weight Readings from Last 5 encounters:   Wt Readings from Last 5 Encounters:   03/18/25 87.6 kg (193 lb 2 oz) (97%, Z= 1.83)*   11/05/24 90.1 kg (198 lb 9.6 oz) (97%, Z= 1.92)*   11/10/23 71.7 kg (158 lb) (88%, Z= 1.17)*   10/27/23 72.2 kg (159 lb 2.8 oz) (89%, Z= 1.21)*   10/06/23 70 kg (154 lb 5.2 oz) (86%, Z= 1.08)*     * Growth percentiles are based on CDC (Girls, 2-20 Years) data.     Objective:   Pertinent Medical Hx: reviewed   Pertinent Labs: reviewed   Pertinent Meds: vitamin D  Skin/Wounds:  none noted  Food Allergies: none noted  Last BM:  Patient stated, Not observed  03/18/25       Current Diet Order/Intake:   Regular diet (safety tray)       Subjective:   Dietary Recall/Energy Intake: RN flowsheets, pt is eating % of meals. This indicates Sufficient energy intake.       Nutrition Focused Physical Exam (NFPE)  Weight Loss: pt has lost 5# (2.5% BW) in <6 months, this weight loss is clinically insignificant and could be regarded as normal weight fluctuation.   Muscle Mass: deferred due to high BMI and clinically insignificant weight loss.   Subcutaneous Fat: deferred due to high BMI and clinically insignificant weight loss.   Fluid Accumulation: none noted  Reduced "  Strength: N/A in acute care setting.    Nutrition Diagnosis:      No nutrition problem identified at this time.      Patient does not meet criteria in congruence with ASPEN/Academy guidelines for malnutrition    Nutrition Interventions:      Continue regular diet.   Weekly weights.   Nutrition rep available to see pt PRN to discuss food preferences and menu inquiries.     Nutrition Monitoring and Evaluation:      Monitor nutrition POC, goal for >/=50% intake from meals and supplements.  Additional fluids per MD/DO  Monitor vital signs pertinent to nutrition.    RD following and will provide updated recommendations as indicated.      Jessica Sosa R.D.                                         ASPEN/AND CRITERIA FOR MALNUTRITION

## 2025-03-19 NOTE — PROGRESS NOTES
Patient discharge with [facility], report and COBRA given to [facility]. X2 belonging bags sent with them. AVS summary signed. Education provided, patient verbalized understanding. All questions answered at this time. IV removed, tip intact. Report given to receiving nurse at  specialty.
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
## 2025-03-20 LAB
6MAM UR CFM-MCNC: <10 NG/ML
CODEINE UR CFM-MCNC: <20 NG/ML
HYDROCODONE UR CFM-MCNC: <20 NG/ML
HYDROMORPHONE UR CFM-MCNC: 1080 NG/ML
MORPHINE UR CFM-MCNC: <20 NG/ML
NORHYDROCODONE UR CFM-MCNC: <20 NG/ML
NOROXYCODONE UR CFM-MCNC: <20 NG/ML
OPIATES UR NOROXYM Q0836: <20 NG/ML
OXYCODONE UR CFM-MCNC: <20 NG/ML
OXYMORPHONE UR CFM-MCNC: <20 NG/ML
THC UR CFM-MCNC: 111 NG/ML

## 2025-03-21 ENCOUNTER — PHARMACY VISIT (OUTPATIENT)
Dept: PHARMACY | Facility: MEDICAL CENTER | Age: 20
End: 2025-03-21
Payer: COMMERCIAL

## 2025-03-21 PROCEDURE — RXOTC WILLOW AMBULATORY OTC CHARGE: Performed by: PHARMACIST

## 2025-04-23 ENCOUNTER — PHARMACY VISIT (OUTPATIENT)
Dept: PHARMACY | Facility: MEDICAL CENTER | Age: 20
End: 2025-04-23
Payer: COMMERCIAL

## 2025-04-23 PROCEDURE — RXMED WILLOW AMBULATORY MEDICATION CHARGE: Performed by: STUDENT IN AN ORGANIZED HEALTH CARE EDUCATION/TRAINING PROGRAM

## 2025-04-23 PROCEDURE — RXOTC WILLOW AMBULATORY OTC CHARGE

## 2025-04-23 RX ORDER — FLUVOXAMINE MALEATE 50 MG
TABLET ORAL
Qty: 10 TABLET | Refills: 0 | OUTPATIENT
Start: 2025-04-23

## 2025-04-29 ENCOUNTER — PHARMACY VISIT (OUTPATIENT)
Dept: PHARMACY | Facility: MEDICAL CENTER | Age: 20
End: 2025-04-29
Payer: COMMERCIAL

## 2025-04-29 PROCEDURE — RXMED WILLOW AMBULATORY MEDICATION CHARGE

## 2025-04-29 RX ORDER — FLUOXETINE 10 MG/1
CAPSULE ORAL
Qty: 60 CAPSULE | Refills: 0 | OUTPATIENT
Start: 2025-04-29

## 2025-05-27 PROCEDURE — RXMED WILLOW AMBULATORY MEDICATION CHARGE

## 2025-05-28 ENCOUNTER — PHARMACY VISIT (OUTPATIENT)
Dept: PHARMACY | Facility: MEDICAL CENTER | Age: 20
End: 2025-05-28
Payer: COMMERCIAL

## 2025-06-09 PROCEDURE — RXMED WILLOW AMBULATORY MEDICATION CHARGE

## 2025-06-10 ENCOUNTER — PHARMACY VISIT (OUTPATIENT)
Dept: PHARMACY | Facility: MEDICAL CENTER | Age: 20
End: 2025-06-10
Payer: COMMERCIAL

## 2025-07-07 ENCOUNTER — PHARMACY VISIT (OUTPATIENT)
Dept: PHARMACY | Facility: MEDICAL CENTER | Age: 20
End: 2025-07-07
Payer: COMMERCIAL

## 2025-07-07 PROCEDURE — RXMED WILLOW AMBULATORY MEDICATION CHARGE

## 2025-07-07 RX ORDER — ESCITALOPRAM OXALATE 10 MG/1
TABLET ORAL
Qty: 90 TABLET | Refills: 0 | OUTPATIENT
Start: 2025-07-07